# Patient Record
Sex: FEMALE | Race: WHITE | Employment: UNEMPLOYED | URBAN - METROPOLITAN AREA
[De-identification: names, ages, dates, MRNs, and addresses within clinical notes are randomized per-mention and may not be internally consistent; named-entity substitution may affect disease eponyms.]

---

## 2017-06-16 PROBLEM — R32 INCONTINENCE IN FEMALE: Status: ACTIVE | Noted: 2017-06-16

## 2017-06-16 PROBLEM — N39.0 URINARY TRACT INFECTION WITHOUT HEMATURIA: Status: ACTIVE | Noted: 2017-06-16

## 2017-07-07 PROBLEM — R21 RASH OF GENITALIA: Status: ACTIVE | Noted: 2017-07-07

## 2017-07-07 PROBLEM — R35.0 URINARY FREQUENCY: Status: ACTIVE | Noted: 2017-07-07

## 2018-07-02 PROBLEM — N93.9 MENSTRUAL BLEEDING PROBLEM: Status: ACTIVE | Noted: 2018-07-02

## 2018-07-02 PROBLEM — R31.9 HEMATURIA: Status: ACTIVE | Noted: 2018-07-02

## 2018-09-21 ENCOUNTER — HOSPITAL ENCOUNTER (OUTPATIENT)
Dept: MAMMOGRAPHY | Age: 53
Discharge: HOME | End: 2018-09-21
Attending: OBSTETRICS & GYNECOLOGY
Payer: COMMERCIAL

## 2018-09-21 DIAGNOSIS — Z12.31 ENCOUNTER FOR SCREENING MAMMOGRAM FOR MALIGNANT NEOPLASM OF BREAST: ICD-10-CM

## 2018-09-21 PROCEDURE — 77067 SCR MAMMO BI INCL CAD: CPT

## 2019-10-16 PROBLEM — G89.29 CHRONIC PAIN OF RIGHT UPPER EXTREMITY: Status: ACTIVE | Noted: 2019-10-16

## 2019-10-16 PROBLEM — M48.02 CERVICAL STENOSIS OF SPINAL CANAL: Status: ACTIVE | Noted: 2019-10-16

## 2019-10-16 PROBLEM — M79.601 CHRONIC PAIN OF RIGHT UPPER EXTREMITY: Status: ACTIVE | Noted: 2019-10-16

## 2019-10-16 PROBLEM — M50.30 DDD (DEGENERATIVE DISC DISEASE), CERVICAL: Status: ACTIVE | Noted: 2019-10-16

## 2019-10-16 PROBLEM — M54.2 NECK PAIN: Status: ACTIVE | Noted: 2019-10-16

## 2019-12-19 ENCOUNTER — HOSPITAL ENCOUNTER (OUTPATIENT)
Dept: SURGERY | Age: 54
Discharge: HOME | End: 2019-12-19
Payer: COMMERCIAL

## 2019-12-19 VITALS
BODY MASS INDEX: 29.69 KG/M2 | RESPIRATION RATE: 16 BRPM | OXYGEN SATURATION: 95 % | HEART RATE: 64 BPM | DIASTOLIC BLOOD PRESSURE: 112 MMHG | TEMPERATURE: 98.1 F | HEIGHT: 63 IN | SYSTOLIC BLOOD PRESSURE: 156 MMHG | WEIGHT: 167.56 LBS

## 2019-12-19 LAB
ANION GAP SERPL CALC-SCNC: 5 MMOL/L (ref 7–16)
APPEARANCE UR: CLEAR
ATRIAL RATE: 54 BPM
BACTERIA SPEC CULT: NORMAL
BASOPHILS # BLD: 0 K/UL (ref 0–0.2)
BASOPHILS NFR BLD: 0 % (ref 0–2)
BILIRUB UR QL: NEGATIVE
BUN SERPL-MCNC: 16 MG/DL (ref 6–23)
CALCIUM SERPL-MCNC: 8.8 MG/DL (ref 8.3–10.4)
CALCULATED P AXIS, ECG09: 60 DEGREES
CALCULATED R AXIS, ECG10: 22 DEGREES
CALCULATED T AXIS, ECG11: 33 DEGREES
CHLORIDE SERPL-SCNC: 111 MMOL/L (ref 98–107)
CO2 SERPL-SCNC: 30 MMOL/L (ref 21–32)
COLOR UR: YELLOW
CREAT SERPL-MCNC: 0.87 MG/DL (ref 0.6–1)
DIAGNOSIS, 93000: NORMAL
DIFFERENTIAL METHOD BLD: NORMAL
EOSINOPHIL # BLD: 0.1 K/UL (ref 0–0.8)
EOSINOPHIL NFR BLD: 1 % (ref 0.5–7.8)
ERYTHROCYTE [DISTWIDTH] IN BLOOD BY AUTOMATED COUNT: 11.9 % (ref 11.9–14.6)
GLUCOSE SERPL-MCNC: 87 MG/DL (ref 65–100)
GLUCOSE UR STRIP.AUTO-MCNC: NEGATIVE MG/DL
HCT VFR BLD AUTO: 45 % (ref 35.8–46.3)
HGB BLD-MCNC: 14.6 G/DL (ref 11.7–15.4)
HGB UR QL STRIP: NEGATIVE
IMM GRANULOCYTES # BLD AUTO: 0.1 K/UL (ref 0–0.5)
IMM GRANULOCYTES NFR BLD AUTO: 1 % (ref 0–5)
KETONES UR QL STRIP.AUTO: NEGATIVE MG/DL
LEUKOCYTE ESTERASE UR QL STRIP.AUTO: NEGATIVE
LYMPHOCYTES # BLD: 3.1 K/UL (ref 0.5–4.6)
LYMPHOCYTES NFR BLD: 44 % (ref 13–44)
MCH RBC QN AUTO: 30.9 PG (ref 26.1–32.9)
MCHC RBC AUTO-ENTMCNC: 32.4 G/DL (ref 31.4–35)
MCV RBC AUTO: 95.3 FL (ref 79.6–97.8)
MONOCYTES # BLD: 0.6 K/UL (ref 0.1–1.3)
MONOCYTES NFR BLD: 9 % (ref 4–12)
NEUTS SEG # BLD: 3.1 K/UL (ref 1.7–8.2)
NEUTS SEG NFR BLD: 45 % (ref 43–78)
NITRITE UR QL STRIP.AUTO: NEGATIVE
NRBC # BLD: 0 K/UL (ref 0–0.2)
P-R INTERVAL, ECG05: 162 MS
PH UR STRIP: 5.5 [PH] (ref 5–9)
PLATELET # BLD AUTO: 235 K/UL (ref 150–450)
PMV BLD AUTO: 10.4 FL (ref 9.4–12.3)
POTASSIUM SERPL-SCNC: 4.1 MMOL/L (ref 3.5–5.1)
PROT UR STRIP-MCNC: NEGATIVE MG/DL
Q-T INTERVAL, ECG07: 422 MS
QRS DURATION, ECG06: 68 MS
QTC CALCULATION (BEZET), ECG08: 400 MS
RBC # BLD AUTO: 4.72 M/UL (ref 4.05–5.2)
SERVICE CMNT-IMP: NORMAL
SODIUM SERPL-SCNC: 146 MMOL/L (ref 136–145)
SP GR UR REFRACTOMETRY: 1.03 (ref 1–1.02)
UROBILINOGEN UR QL STRIP.AUTO: 0.2 EU/DL (ref 0.2–1)
VENTRICULAR RATE, ECG03: 54 BPM
WBC # BLD AUTO: 7 K/UL (ref 4.3–11.1)

## 2019-12-19 PROCEDURE — 93005 ELECTROCARDIOGRAM TRACING: CPT | Performed by: ANESTHESIOLOGY

## 2019-12-19 PROCEDURE — 87641 MR-STAPH DNA AMP PROBE: CPT

## 2019-12-19 PROCEDURE — 85025 COMPLETE CBC W/AUTO DIFF WBC: CPT

## 2019-12-19 PROCEDURE — 81003 URINALYSIS AUTO W/O SCOPE: CPT

## 2019-12-19 PROCEDURE — 80048 BASIC METABOLIC PNL TOTAL CA: CPT

## 2019-12-19 PROCEDURE — 77030027138 HC INCENT SPIROMETER -A

## 2019-12-19 RX ORDER — GABAPENTIN 100 MG/1
100 CAPSULE ORAL AS NEEDED
COMMUNITY
End: 2020-07-30

## 2019-12-19 NOTE — PERIOP NOTES
PLEASE CONTINUE TAKING ALL PRESCRIPTION MEDICATIONS UP TO THE DAY OF SURGERY UNLESS OTHERWISE DIRECTED BELOW. DISCONTINUE all vitamins and supplements 7 days prior to surgery. DISCONTINUE Non-Steriodal Anti-Inflammatory (NSAIDS) such as Advil and Aleve 5 days prior to surgery. Home Medications to take  the day of surgery    Buspirone, Citalopram, Esomeprazole           Home Medications   to Hold           Comments   May have Tylenol if needed             Please do not bring home medications with you on the day of surgery unless otherwise directed by your nurse. If you are instructed to bring home medications, please give them to your nurse as they will be administered by the nursing staff. If you have any questions, please call [facility] [phone] or [address] [place] [address] [phone].

## 2019-12-19 NOTE — PERIOP NOTES
Patient verified name & . Order to obtain consent:Order for consent NOT found in EHR at time of PAT visit. Unable to verify case posting against order; surgery verified by patient. TYPE  CASE:lb              Orders: NO received  Labs per Spine protocol:  CBC, BMP, MRSA, U/A   Labs per anesthesia protocol: EKG, T&S on DOS  EKG/cardiac records  : Today, SB (54) acceptable per anesthesia guidelines. Glucose: Not required    Medication bottles were NOT visualized today. Instructed patient to continue previous medications as prescribed prior to surgery and  to [address] ON THE DAY OF SURGERY according to anesthesia guidelines with a small sip of water : Celexa, Buspar, Protonix       Continue all previous medications unless otherwise directed. Instructed patient to hold all vitamins and supplements (with exception of renal vitamins) and the following medications prior to surgery: NSAIDS    Pt viewed Spine Pre-hab Video. All further questions were addressed. Pt was provided with antibacterial or non-moisturizing soap, Hibiclens, long-handled sponge, Spine Recovery booklet and incentive spirometer. Pt correctly demonstrated use of incentive spirometer and instruction to bring it to the hospital on day of surgery. Handouts and all Surgery instructions provided to pt and pt verbalizes understanding. Patient Guide to Surgery Packet provided to patient. Packet includes Patient Guide to surgery handout, Facts about Pain Management handout, Facts about Urinary Catherization handout, Hand Hygiene handout, Patient Education and Teaching Sheet -Transfusion of Blood and Blood Products handout, and  [facility] frequent question and answer sheet. Guide reviewed with the patient and all questions answered to the satisfaction of the patient. Pt advised to visit [email or web address] for more educational information regarding anesthesia and to record any additional questions that arise so that it can be addressed by the anesthesiologist on the morning of surgery. Patient instructed on the following and verbalized understanding:  Arrive at main entrance, time of arrival to be called the day before by 1700. Responsible adult must drive patient to and from hospital, stay during surgery and 24 hours postoperatively. Npo after midnight including gum, mints and ice chips. Shower using hibiclens the night before and the morning of surgery. Hibiclens provided to the patient with handout and verbal instructions for use. Leave all valuables at home. Instructed on no jewelry or body piercings on the dos. Bring insurance card and ID. No perfumes, oil, powder, colognes, makeup or  lotions on the skin. Patient verbalized understanding of all instructions and provided all medical/health information to the best of their ability.

## 2019-12-19 NOTE — PERIOP NOTES
Patient tested positive for Flu A on 12/16/2019, called and informed Dr. Joselin Alicia. Patient has no fever, lung sounds are clear. Is taking Tamiflu, will be completed on 12/20/2019. Per Dr. Joselin Alicia, if patient has fever she will need to call and notify Dr. Luis Miguel Hansen.

## 2019-12-24 RX ORDER — CHLORTHALIDONE 25 MG/1
12.5 TABLET ORAL DAILY
COMMUNITY
End: 2020-07-30

## 2019-12-24 RX ORDER — CYCLOBENZAPRINE HCL 10 MG
TABLET ORAL
COMMUNITY
End: 2020-02-04 | Stop reason: SDUPTHER

## 2019-12-24 NOTE — PERIOP NOTES
Patient called to update medication list.  Chorthalidone and Flexeril added to medication list.  Patient instructed to not take either medication dos. Patient verbalized understanding.

## 2019-12-26 ENCOUNTER — ANESTHESIA EVENT (OUTPATIENT)
Dept: SURGERY | Age: 54
End: 2019-12-26
Payer: COMMERCIAL

## 2019-12-27 ENCOUNTER — ANESTHESIA (OUTPATIENT)
Dept: SURGERY | Age: 54
End: 2019-12-27
Payer: COMMERCIAL

## 2019-12-27 ENCOUNTER — HOSPITAL ENCOUNTER (OUTPATIENT)
Age: 54
Setting detail: OUTPATIENT SURGERY
Discharge: HOME | End: 2019-12-27
Attending: NEUROLOGICAL SURGERY | Admitting: NEUROLOGICAL SURGERY
Payer: COMMERCIAL

## 2019-12-27 ENCOUNTER — APPOINTMENT (OUTPATIENT)
Dept: GENERAL RADIOLOGY | Age: 54
End: 2019-12-27
Attending: NEUROLOGICAL SURGERY
Payer: COMMERCIAL

## 2019-12-27 VITALS
DIASTOLIC BLOOD PRESSURE: 67 MMHG | BODY MASS INDEX: 28.56 KG/M2 | HEIGHT: 63 IN | WEIGHT: 161.2 LBS | OXYGEN SATURATION: 94 % | RESPIRATION RATE: 18 BRPM | SYSTOLIC BLOOD PRESSURE: 150 MMHG | TEMPERATURE: 98.6 F | HEART RATE: 83 BPM

## 2019-12-27 DIAGNOSIS — M48.02 CERVICAL STENOSIS OF SPINAL CANAL: Primary | ICD-10-CM

## 2019-12-27 LAB
ABO + RH BLD: NORMAL
BLOOD GROUP ANTIBODIES SERPL: NORMAL
SPECIMEN EXP DATE BLD: NORMAL

## 2019-12-27 PROCEDURE — 77030021678 HC GLIDESCP STAT DISP VERT -B: Performed by: ANESTHESIOLOGY

## 2019-12-27 PROCEDURE — 74011000250 HC RX REV CODE- 250: Performed by: NEUROLOGICAL SURGERY

## 2019-12-27 PROCEDURE — 76210000016 HC OR PH I REC 1 TO 1.5 HR: Performed by: NEUROLOGICAL SURGERY

## 2019-12-27 PROCEDURE — 74011250636 HC RX REV CODE- 250/636: Performed by: NEUROLOGICAL SURGERY

## 2019-12-27 PROCEDURE — 77030012894: Performed by: NEUROLOGICAL SURGERY

## 2019-12-27 PROCEDURE — 72040 X-RAY EXAM NECK SPINE 2-3 VW: CPT

## 2019-12-27 PROCEDURE — 77030036803 HC CGE SPN ANT CERV STALIFC CTNL -I: Performed by: NEUROLOGICAL SURGERY

## 2019-12-27 PROCEDURE — 74011250636 HC RX REV CODE- 250/636: Performed by: NURSE ANESTHETIST, CERTIFIED REGISTERED

## 2019-12-27 PROCEDURE — 77030011267 HC ELECTRD BLD COVD -A: Performed by: NEUROLOGICAL SURGERY

## 2019-12-27 PROCEDURE — 76210000020 HC REC RM PH II FIRST 0.5 HR: Performed by: NEUROLOGICAL SURGERY

## 2019-12-27 PROCEDURE — 77030025420 HC BUR NEUR TPS STRY -C: Performed by: NEUROLOGICAL SURGERY

## 2019-12-27 PROCEDURE — 77030018836 HC SOL IRR NACL ICUM -A: Performed by: NEUROLOGICAL SURGERY

## 2019-12-27 PROCEDURE — 77030018390 HC SPNG HEMSTAT2 J&J -B: Performed by: NEUROLOGICAL SURGERY

## 2019-12-27 PROCEDURE — 76060000034 HC ANESTHESIA 1.5 TO 2 HR: Performed by: NEUROLOGICAL SURGERY

## 2019-12-27 PROCEDURE — 77030040361 HC SLV COMPR DVT MDII -B: Performed by: NEUROLOGICAL SURGERY

## 2019-12-27 PROCEDURE — 77030009868 HC PIN DISTR CASPR AESC -B: Performed by: NEUROLOGICAL SURGERY

## 2019-12-27 PROCEDURE — 77030040922 HC BLNKT HYPOTHRM STRY -A: Performed by: ANESTHESIOLOGY

## 2019-12-27 PROCEDURE — 77030010507 HC ADH SKN DERMBND J&J -B: Performed by: NEUROLOGICAL SURGERY

## 2019-12-27 PROCEDURE — 77030019908 HC STETH ESOPH SIMS -A: Performed by: ANESTHESIOLOGY

## 2019-12-27 PROCEDURE — 77030037088 HC TUBE ENDOTRACH ORAL NSL COVD-A: Performed by: ANESTHESIOLOGY

## 2019-12-27 PROCEDURE — 74011000250 HC RX REV CODE- 250: Performed by: NURSE ANESTHETIST, CERTIFIED REGISTERED

## 2019-12-27 PROCEDURE — 86900 BLOOD TYPING SEROLOGIC ABO: CPT

## 2019-12-27 PROCEDURE — 77030003029 HC SUT VCRL J&J -B: Performed by: NEUROLOGICAL SURGERY

## 2019-12-27 PROCEDURE — 74011250636 HC RX REV CODE- 250/636: Performed by: ANESTHESIOLOGY

## 2019-12-27 PROCEDURE — 77030003666 HC NDL SPINAL BD -A: Performed by: NEUROLOGICAL SURGERY

## 2019-12-27 PROCEDURE — 88304 TISSUE EXAM BY PATHOLOGIST: CPT

## 2019-12-27 PROCEDURE — 76010000153 HC OR TIME 1.5 TO 2 HR: Performed by: NEUROLOGICAL SURGERY

## 2019-12-27 PROCEDURE — 77030030163 HC BN WAX J&J -A: Performed by: NEUROLOGICAL SURGERY

## 2019-12-27 PROCEDURE — C1713 ANCHOR/SCREW BN/BN,TIS/BN: HCPCS | Performed by: NEUROLOGICAL SURGERY

## 2019-12-27 PROCEDURE — 74011250637 HC RX REV CODE- 250/637: Performed by: NEUROLOGICAL SURGERY

## 2019-12-27 DEVICE — IMPLANTABLE DEVICE
Type: IMPLANTABLE DEVICE | Site: SPINE CERVICAL | Status: FUNCTIONAL
Brand: STALIF C

## 2019-12-27 DEVICE — GRAFT BNE SUB 1CC 2MM GRAN ALLOGENIC MORPHOGENETIC PROT W: Type: IMPLANTABLE DEVICE | Site: SPINE CERVICAL | Status: FUNCTIONAL

## 2019-12-27 DEVICE — IMPLANTABLE DEVICE
Type: IMPLANTABLE DEVICE | Site: SPINE CERVICAL | Status: FUNCTIONAL
Brand: STALIF C-TI

## 2019-12-27 RX ORDER — OXYCODONE AND ACETAMINOPHEN 5; 325 MG/1; MG/1
1 TABLET ORAL AS NEEDED
Status: DISCONTINUED | OUTPATIENT
Start: 2019-12-27 | End: 2019-12-27 | Stop reason: HOSPADM

## 2019-12-27 RX ORDER — SODIUM CHLORIDE, SODIUM LACTATE, POTASSIUM CHLORIDE, CALCIUM CHLORIDE 600; 310; 30; 20 MG/100ML; MG/100ML; MG/100ML; MG/100ML
75 INJECTION, SOLUTION INTRAVENOUS CONTINUOUS
Status: DISCONTINUED | OUTPATIENT
Start: 2019-12-27 | End: 2019-12-27 | Stop reason: HOSPADM

## 2019-12-27 RX ORDER — SODIUM CHLORIDE 0.9 % (FLUSH) 0.9 %
5-40 SYRINGE (ML) INJECTION AS NEEDED
Status: DISCONTINUED | OUTPATIENT
Start: 2019-12-27 | End: 2019-12-27 | Stop reason: HOSPADM

## 2019-12-27 RX ORDER — EPHEDRINE SULFATE/0.9% NACL/PF 50 MG/5 ML
SYRINGE (ML) INTRAVENOUS AS NEEDED
Status: DISCONTINUED | OUTPATIENT
Start: 2019-12-27 | End: 2019-12-27 | Stop reason: HOSPADM

## 2019-12-27 RX ORDER — LIDOCAINE HYDROCHLORIDE 20 MG/ML
INJECTION, SOLUTION EPIDURAL; INFILTRATION; INTRACAUDAL; PERINEURAL AS NEEDED
Status: DISCONTINUED | OUTPATIENT
Start: 2019-12-27 | End: 2019-12-27 | Stop reason: HOSPADM

## 2019-12-27 RX ORDER — FENTANYL CITRATE 50 UG/ML
INJECTION, SOLUTION INTRAMUSCULAR; INTRAVENOUS AS NEEDED
Status: DISCONTINUED | OUTPATIENT
Start: 2019-12-27 | End: 2019-12-27 | Stop reason: HOSPADM

## 2019-12-27 RX ORDER — LIDOCAINE HYDROCHLORIDE 10 MG/ML
0.1 INJECTION INFILTRATION; PERINEURAL AS NEEDED
Status: DISCONTINUED | OUTPATIENT
Start: 2019-12-27 | End: 2019-12-27 | Stop reason: HOSPADM

## 2019-12-27 RX ORDER — PROPOFOL 10 MG/ML
INJECTION, EMULSION INTRAVENOUS AS NEEDED
Status: DISCONTINUED | OUTPATIENT
Start: 2019-12-27 | End: 2019-12-27 | Stop reason: HOSPADM

## 2019-12-27 RX ORDER — CEFAZOLIN SODIUM/WATER 2 G/20 ML
2 SYRINGE (ML) INTRAVENOUS ONCE
Status: COMPLETED | OUTPATIENT
Start: 2019-12-27 | End: 2019-12-27

## 2019-12-27 RX ORDER — MIDAZOLAM HYDROCHLORIDE 1 MG/ML
2 INJECTION, SOLUTION INTRAMUSCULAR; INTRAVENOUS
Status: COMPLETED | OUTPATIENT
Start: 2019-12-27 | End: 2019-12-27

## 2019-12-27 RX ORDER — DEXAMETHASONE SODIUM PHOSPHATE 4 MG/ML
INJECTION, SOLUTION INTRA-ARTICULAR; INTRALESIONAL; INTRAMUSCULAR; INTRAVENOUS; SOFT TISSUE AS NEEDED
Status: DISCONTINUED | OUTPATIENT
Start: 2019-12-27 | End: 2019-12-27 | Stop reason: HOSPADM

## 2019-12-27 RX ORDER — OXYCODONE AND ACETAMINOPHEN 7.5; 325 MG/1; MG/1
1 TABLET ORAL
Qty: 15 TAB | Refills: 0 | Status: SHIPPED | OUTPATIENT
Start: 2019-12-27 | End: 2020-01-01

## 2019-12-27 RX ORDER — SODIUM CHLORIDE 0.9 % (FLUSH) 0.9 %
5-40 SYRINGE (ML) INJECTION EVERY 8 HOURS
Status: DISCONTINUED | OUTPATIENT
Start: 2019-12-27 | End: 2019-12-27 | Stop reason: HOSPADM

## 2019-12-27 RX ORDER — HYDROMORPHONE HYDROCHLORIDE 2 MG/ML
0.5 INJECTION, SOLUTION INTRAMUSCULAR; INTRAVENOUS; SUBCUTANEOUS
Status: DISCONTINUED | OUTPATIENT
Start: 2019-12-27 | End: 2019-12-27 | Stop reason: HOSPADM

## 2019-12-27 RX ORDER — ROCURONIUM BROMIDE 10 MG/ML
INJECTION, SOLUTION INTRAVENOUS AS NEEDED
Status: DISCONTINUED | OUTPATIENT
Start: 2019-12-27 | End: 2019-12-27 | Stop reason: HOSPADM

## 2019-12-27 RX ORDER — NALOXONE HYDROCHLORIDE 0.4 MG/ML
0.2 INJECTION, SOLUTION INTRAMUSCULAR; INTRAVENOUS; SUBCUTANEOUS AS NEEDED
Status: DISCONTINUED | OUTPATIENT
Start: 2019-12-27 | End: 2019-12-27 | Stop reason: HOSPADM

## 2019-12-27 RX ADMIN — MIDAZOLAM 2 MG: 1 INJECTION INTRAMUSCULAR; INTRAVENOUS at 09:41

## 2019-12-27 RX ADMIN — FENTANYL CITRATE 50 MCG: 50 INJECTION INTRAMUSCULAR; INTRAVENOUS at 11:16

## 2019-12-27 RX ADMIN — Medication 3 AMPULE: at 09:27

## 2019-12-27 RX ADMIN — Medication 5 MG: at 12:01

## 2019-12-27 RX ADMIN — PHENYLEPHRINE HYDROCHLORIDE 100 MCG: 10 INJECTION INTRAVENOUS at 12:01

## 2019-12-27 RX ADMIN — DEXAMETHASONE SODIUM PHOSPHATE 4 MG: 4 INJECTION, SOLUTION INTRAMUSCULAR; INTRAVENOUS at 11:32

## 2019-12-27 RX ADMIN — ROCURONIUM BROMIDE 40 MG: 10 INJECTION, SOLUTION INTRAVENOUS at 11:04

## 2019-12-27 RX ADMIN — SODIUM CHLORIDE, SODIUM LACTATE, POTASSIUM CHLORIDE, AND CALCIUM CHLORIDE 75 ML/HR: 600; 310; 30; 20 INJECTION, SOLUTION INTRAVENOUS at 09:27

## 2019-12-27 RX ADMIN — HYDROMORPHONE HYDROCHLORIDE 0.5 MG: 2 INJECTION INTRAMUSCULAR; INTRAVENOUS; SUBCUTANEOUS at 12:52

## 2019-12-27 RX ADMIN — SUGAMMADEX 200 MG: 100 INJECTION, SOLUTION INTRAVENOUS at 12:12

## 2019-12-27 RX ADMIN — FENTANYL CITRATE 50 MCG: 50 INJECTION INTRAMUSCULAR; INTRAVENOUS at 11:04

## 2019-12-27 RX ADMIN — LIDOCAINE HYDROCHLORIDE 40 MG: 20 INJECTION, SOLUTION EPIDURAL; INFILTRATION; INTRACAUDAL; PERINEURAL at 11:04

## 2019-12-27 RX ADMIN — PROPOFOL 200 MG: 10 INJECTION, EMULSION INTRAVENOUS at 11:55

## 2019-12-27 RX ADMIN — Medication 5 MG: at 11:51

## 2019-12-27 RX ADMIN — Medication 10 MG: at 12:07

## 2019-12-27 RX ADMIN — Medication 2 G: at 11:13

## 2019-12-27 RX ADMIN — FENTANYL CITRATE 25 MCG: 50 INJECTION INTRAMUSCULAR; INTRAVENOUS at 11:56

## 2019-12-27 RX ADMIN — FENTANYL CITRATE 25 MCG: 50 INJECTION INTRAMUSCULAR; INTRAVENOUS at 11:59

## 2019-12-27 RX ADMIN — HYDROMORPHONE HYDROCHLORIDE 0.5 MG: 2 INJECTION INTRAMUSCULAR; INTRAVENOUS; SUBCUTANEOUS at 13:01

## 2019-12-27 RX ADMIN — ROCURONIUM BROMIDE 10 MG: 10 INJECTION, SOLUTION INTRAVENOUS at 11:55

## 2019-12-27 RX ADMIN — PROPOFOL 200 MG: 10 INJECTION, EMULSION INTRAVENOUS at 11:04

## 2019-12-27 NOTE — ANESTHESIA POSTPROCEDURE EVALUATION
Procedure(s):  ANTERIOR CERVICAL DISCECTOMY WITH FUSION C3-4.    general    Anesthesia Post Evaluation      Multimodal analgesia: multimodal analgesia used between 6 hours prior to anesthesia start to PACU discharge  Patient location during evaluation: PACU  Patient participation: complete - patient participated  Level of consciousness: awake and alert  Pain management: adequate  Airway patency: patent  Anesthetic complications: no  Cardiovascular status: acceptable  Respiratory status: acceptable  Hydration status: acceptable  Post anesthesia nausea and vomiting:  none      Vitals Value Taken Time   /67 12/27/2019  1:32 PM   Temp 37 °C (98.6 °F) 12/27/2019  1:06 PM   Pulse 78 12/27/2019  1:37 PM   Resp 18 12/27/2019  1:32 PM   SpO2 94 % 12/27/2019  1:37 PM   Vitals shown include unvalidated device data.

## 2019-12-27 NOTE — DISCHARGE INSTRUCTIONS
[facility]  [address]  [phone]    Cervical (Neck) Fusion Postoperative Home Instructions    - SHOWERING: You may shower the first day you are home with the dressing on. Do not soak in a tub for at least three weeks. If your dressing gets wet, change it according to the written instructions below. You may remove the dressing in 3 days. Use only soap and water on the incision and pat it dry. Do not scrub the incision.    - WOUND CARE: A small to moderate amount of reddish drainage on the dressing is normal the first 1-2 days after surgery. If your dressing becomes soaked with drainage, let your doctor know. [facility] HANDS BEFORE AND AFTER INCISION CARE.    - SIGNS OF INFECTION: Please notify your physician for any of the following: a temperature greater than 100.5, extreme tenderness at the wound, excessive redness and/or swelling, or large amounts of drainage from the wound. If you think you have a wound infection, call your physician's office immediately.    - SWALLOWING: Don't be alarmed if it seems there is a lump in your throat for several days after surgery- this is normal. Eat only soft foods and drink plenty of fluids until your throat feels better. If you begin having trouble swallowing, call the office immediately. -EATING: [place] foods today, nothing spicy or greasy.    - DRIVING: You may not begin driving until after your visit to the physician's office for a wound and suture check. This is normally 7-10 days after you come home from the hospital.    - MEDICATIONS: You may not take anti-inflammatory medications. These include over-the-counter ibuprofen products such as Motrin, Advil, Aleve and other related medications or prescription medications such as Ultram, Naproxen, Indocin, or Celebrex and others. These medications slow down the bone healing. You may take Tylenol unless your prescribed medication has Tylenol in it.  The pain medicine prescribed may be taken as needed. You should continue taking a stool softener twice a day, drink plenty of water and eat high fiber foods to avoid constipation. This is a common problem patients experience while taking pain medicine.    - DEEP BREATHING EXERCISES: Continue to use your incentive spirometer for deep breathing exercises. - ACTIVITY: Avoid reaching overhead, particularly to lift things, until you have been told that your fusion has healed. Do not lift objects heavier than a coffee cup or a newspaper. You shouldn't lift anything heavier than 2-5 pounds. When lifting, you should bend with your knees and keep your back straight. Sleeping may be difficult and sometimes requires you to change positions frequently; try to sleep with your head elevated 15-30 degrees. You may turn your head gradually from side to side, but avoid placing your chin to your chest or flexing your head backwards. You may remove your TITI hose when consistently walking. You may do steps and inclines in moderation.    - SEXUAL RELATIONS: You may resume sexual relations 2 weeks after your surgery. - WALKING PROGRAM: After your sutures are removed or dissolved, it is very important that you begin a progressive walking program. Walking strengthens the spinal muscles and will help protect your disc and vertebrae. You will need to increase your walking program up to two miles per day over the next four weeks. You should begin slowly with 1/8 to 1/4 of a mile and add to this each day. Please be very consistent with your walking program, as this is a vital part of your recovery.    - SYMPTOMS AFTER SURGERY: Don't be alarmed if you still have some of the same symptoms you had prior to surgery. The nerves often require time to heal after the pressure has been relieved. You may also experience pain between your shoulder blades which is common after this surgery.  The level of pain you experience should improve as your body heals. Please call our office if you have any other questions or problems. Listen to your body; it will tell you if you are overdoing it. Use common sense and take care of yourself! After general anesthesia or intravenous sedation, for 24 hours or while taking prescription Narcotics:  · Limit your activities  · A responsible adult needs to be with you for the next 24 hours  · Do not drive and operate hazardous machinery  · Do not make important personal or business decisions  · Do  not drink alcoholic beverages  · If you have not urinated within 8 hours after discharge, please contact your surgeon on call. · If you have sleep apnea and have a CPAP machine, please use it for all naps and sleeping. · Please use caution when taking narcotics and any of your home medications that may cause drowsiness. *  Please give a list of your current medications to your Primary Care Provider. *  Please update this list whenever your medications are discontinued, doses are      changed, or new medications (including over-the-counter products) are added. *  Please carry medication information at all times in case of emergency situations. These are general instructions for a healthy lifestyle:  No smoking/ No tobacco products/ Avoid exposure to second hand smoke  Surgeon General's Warning:  Quitting smoking now greatly reduces serious risk to your health. Obesity, smoking, and sedentary lifestyle greatly increases your risk for illness  A healthy diet, regular physical exercise & weight monitoring are important for maintaining a healthy lifestyle    You may be retaining fluid if you have a history of heart failure or if you experience any of the following symptoms:  Weight gain of 3 pounds or more overnight or 5 pounds in a week, increased swelling in our hands or feet or shortness of breath while lying flat in bed.   Please call your doctor as soon as you notice any of these symptoms; do not wait until your next office visit.

## 2019-12-27 NOTE — H&P
[address] [place] [address]  HISTORY AND PHYSICAL    Name:  Tayler Patterson  MR#:  [id number]  :  1965  ACCOUNT #:  [de-identified]  ADMIT DATE:  2019      CHIEF COMPLAINT:  Right upper extremity pain x years. HISTORY OF PRESENT ILLNESS:  A 61-year lady with a greater than 10-year history of right upper extremity pain refractory to physical therapy, cortisone injections, and pain management; and previous cervical fusion, C4 through C6. MRI scan was positive for spinal stenosis at C3-C4, confirmed by MRI scanning. EMG was positive for radiculopathy at or above the C4 level. ALLERGIES:  PENICILLIN. PAST MEDICAL HISTORY:  Significant for renal calculi, GERD, hypertension, migraine headaches, anxiety, and restless legs syndrome. FAMILY HISTORY:  Positive for stroke, heart disease, and hypertension. SOCIAL HISTORY:  She is . She is a former smoker, having smoked for 32 years and quit in 2016. She is a nondrinker. REVIEW OF SYSTEMS:  Negative for shortness of breath, chest pain, or fatigue. PHYSICAL EXAMINATION:  GENERAL:  A well-developed, well-nourished lady in no obvious distress except for right upper extremity pain today. HEENT:  Unremarkable. Nose and throat are clear. CHEST:  Clear bilaterally. CARDIAC:  Regular rate and rhythm. No murmurs or gallops. ABDOMEN:  Soft, benign, nontender. No masses. Bowel sounds positive. EXTREMITIES:  Free of deformities. NEUROLOGIC:  Awake, alert, oriented x3. Cranial nerves II through XII intact. Motor strength 5/5 except for right external rotators at the shoulder, both weak at 3/5. Symmetric reflexes. Normal gait. No tremor. IMPRESSION:  Cervical stenosis of the spinal canal with cervical radiculopathy. Conservative measures have failed. PLAN:  Anterior cervical diskectomy and fusion, C3-C4.   The risks are thoroughly explained that include bleeding, infection, weakness, numbness, persistent pain, CSF leak, dysphonia, dysphagia, vascular injury, and death. The existing neurological deficiency may or may not improve even with successful surgery. The patient understands and agrees to proceed.       MD RICK [email or web address]  D:  12/27/2019 10:49  T:  12/27/2019 14:46  JOB #:  [id number]

## 2019-12-27 NOTE — ANESTHESIA PREPROCEDURE EVALUATION
Relevant Problems   No relevant active problems       Anesthetic History     PONV          Review of Systems / Medical History  Patient summary reviewed and pertinent labs reviewed    Pulmonary  Within defined limits                 Neuro/Psych         Headaches (Migraine headaches) and psychiatric history    Comments: Cervical disc disease, right arm pain and numbness    RLS    Anxiety   Cardiovascular    Hypertension (Recently started HCTZ): well controlled              Exercise tolerance: >4 METS     GI/Hepatic/Renal     GERD: well controlled           Endo/Other        Arthritis     Other Findings              Physical Exam    Airway  Mallampati: II  TM Distance: 4 - 6 cm  Neck ROM: normal range of motion   Mouth opening: Normal     Cardiovascular    Rhythm: regular           Dental    Dentition: Bridges     Pulmonary  Breath sounds clear to auscultation               Abdominal  GI exam deferred       Other Findings            Anesthetic Plan    ASA: 2  Anesthesia type: general          Induction: Intravenous  Anesthetic plan and risks discussed with: Patient

## 2019-12-27 NOTE — BRIEF OP NOTE
BRIEF OPERATIVE NOTE    Date of Procedure: 12/27/2019   Preoperative Diagnosis: Cervical spinal stenosis [M48.02]  Postoperative Diagnosis: Cervical spinal stenosis [M48.02]    Procedure(s):  ANTERIOR CERVICAL DISCECTOMY WITH FUSION C3-4  Surgeon(s) and Role:     * Elba Macario MD - Primary         Surgical Assistant: NONE  Surgical Staff:  Circ-1: Karuna Negron RN  Circ-Relief: Leonid Caballero RN  Radiology Technician: Callum Mayes, RT, R, CT; Mark Cuadra, RT, R, CT  Scrub Tech-1: Loida Manzo  Scrub Tech-Relief: Julia Lee  Scrub Private/Assistant: Nish Silvermans  Event Time In Time Out   Incision Start 1125    Incision Close 1215      Anesthesia: General   Estimated Blood Loss: MINIMAL  Specimens:   ID Type Source Tests Collected by Time Destination   1 : Disc Material C3-4 Preservative Disc Material  Elba Macario MD 12/27/2019 1213 Pathology      Findings: STENOSIS  Complications: NONE  Implants:   Implant Name Type Inv.  Item Serial No.  Lot No. LRB No. Used Action   GRAFT BNE GRAN 1ML -- OSTEOAMP - [id number]  GRAFT BNE GRAN 1ML -- OSTEOAMP [id number] [facility]  N/A 1 Implanted   SCR SPNE ABO 4X14MM --  - [id number]  SCR SPNE ABO 4X14MM --   [employer] [id number] N/A 3 Implanted   CAGE SPNE CERV LORDTC 5.5X12MM -- STALIF-C - [id number]  CAGE SPNE CERV LORDTC 5.5X12MM -- STALIF-C  [employer] [id number] N/A 1 Implanted

## 2019-12-28 NOTE — OP NOTES
[facility]  OPERATIVE REPORT    Name:  Iraj Juarez  MR#:  [id number]  :  1965  ACCOUNT #:  [de-identified]  DATE OF SERVICE:  2019    PREOPERATIVE DIAGNOSIS:  Cervical stenosis with cord compression, C3-4. POSTOPERATIVE DIAGNOSIS:  Cervical stenosis with cord compression, C3-4. OPERATIONS AND PROCEDURES:  1. Anterior cervical spine total diskectomy with decompression of spinal cord and nerve roots, C3-4.  2.  Anterior cervical spine interbody fusion with STALIF-C cage, OsteoAMP and bone marrow aspirate, C3-4.  3. Anterior cervical spine instrumentation with STALIF-C screws, C3-4.  4.  Bone marrow aspiration, C3 vertebra. 5.  Continuous intraoperative fluoroscopy. SURGEON:  Ama Rooney MD    FIRST ASSISTANT:  None. ANESTHESIA:  General endotracheal.    ESTIMATED BLOOD LOSS:  Minimal.    PREPARATION:  ChloraPrep. COMPLICATIONS:  None. SPECIMENS REMOVED:  C3-4  disc    IMPLANTS:  See below    HISTORY OF PRESENT ILLNESS:  A 26-year-old lady status post cervical fusion, C4-6 many years ago, now presents with progressive cervical myelopathy refractory to conservative measures. MRI scan was positive for spinal stenosis and cord compression above the fusion at C3-4 and the patient was admitted for surgery as conservative measures have failed. OPERATIVE NOTE:  The patient was brought to the operating. She was carefully placed under general endotracheal anesthesia without complications, placed supine with a roll under the shoulder blades. The head gently extended on a Carson's pillow and the right side of the neck carefully prepped in the usual sterile fashion. C-arm fluoroscopy was used to locate the C3-4 level. An existing skin crease was identified on the right side. This was infiltrated with 1% Xylocaine with epinephrine, incised with 15-blade and carried sharply through the platysma.   Using rostral to caudal subplatysmal dissection, a plane was created within the carotid artery laterally, trachea and esophagus medially. Handheld retractors were placed. The longus coli muscles were stripped bilaterally. Lateral C-arm fluoroscopy confirmed. An 18-gauge spinal needle was inserted correctly into C3-4. Scar tissue was sharply dissected above the plate which was identified. The disk space was then opened with a 15-blade and cleaned with pituitary rongeurs until clear. The [place] distracting pin was placed in the C3 vertebra and using a 3 mm syringe and a 16-gauge Angiocath, approximately 2 mm of bone marrow were aspirated and mixed with OsteoAMP on the back table. The hole was plugged with Nu-Knit, Surgicel, cautery and bone wax. The [employer] drill was used and the endplates at O4-8 were drilled down the posterior ligament which was opened with a 1-mm [place] rongeur and a ball-tip probe was used to probe the neuroforamina which were opened bilaterally. At this point, the wound was irrigated until clear. A 5.5 x 12 mm STALIF-C trial was tightly placed in the disk space with good endplate contact and distraction noted. A 5.5 x 12 mm STALIF-C cage was packed with OsteoAMP and bone marrow aspirate and countersunk to a nice tight fit confirmed by fluoroscopy. STALIF-C screws were placed, two superiorly and one inferiorly with a straight awl and 40 mm self-tapping screws. A nice tight fit was achieved. The wound was irrigated until clear and x-rays were obtained which confirmed good position of the graft and hardware, both in AP and lateral projections. Additional thrombin was placed. Surgifoam was placed. No further bleeding was encountered. The wound was dried and it was closed. The platysma was closed tightly with interrupted 3-0 Vicryl. Subcutaneous tissue closed with #3-0 Vicryl. Skin was closed with Dermabond. The patient tolerated the procedure well, was awakened, extubated and taken to PACU in stable condition.   There were no obvious complications. DISCHARGE INSTRUCTIONS:  Diet regular. Activities as tolerated. No heavy lifting, bending or automobile driving. Showers are permissible but no bath. The patient will contact the physician if she develops any fever, drainage, swelling, cellulitis or neurological change. The patient will contact the office if she develops any fever, drainage or swelling. Return visit in two weeks for wound check. Discharge medications include admission medicines plus Percocet 7.5/325 q.8 hours p.r.n. DISCHARGE CONDITION:  Satisfactory.       MD RICK [email or web address]  D:  12/27/2019 12:23  T:  12/27/2019 23:54  JOB #:  [id number]

## 2019-12-30 ENCOUNTER — APPOINTMENT (OUTPATIENT)
Dept: CT IMAGING | Age: 54
End: 2019-12-30
Attending: EMERGENCY MEDICINE
Payer: COMMERCIAL

## 2019-12-30 ENCOUNTER — HOSPITAL ENCOUNTER (EMERGENCY)
Age: 54
Discharge: HOME | End: 2019-12-30
Attending: EMERGENCY MEDICINE
Payer: COMMERCIAL

## 2019-12-30 VITALS
WEIGHT: 161 LBS | DIASTOLIC BLOOD PRESSURE: 92 MMHG | OXYGEN SATURATION: 96 % | HEIGHT: 63 IN | HEART RATE: 81 BPM | SYSTOLIC BLOOD PRESSURE: 140 MMHG | TEMPERATURE: 97.8 F | BODY MASS INDEX: 28.53 KG/M2 | RESPIRATION RATE: 18 BRPM

## 2019-12-30 DIAGNOSIS — R22.1 NECK SWELLING: Primary | ICD-10-CM

## 2019-12-30 LAB
ALBUMIN SERPL-MCNC: 3.1 G/DL (ref 3.5–5)
ALBUMIN/GLOB SERPL: 0.7 {RATIO} (ref 1.2–3.5)
ALP SERPL-CCNC: 78 U/L (ref 50–136)
ALT SERPL-CCNC: 109 U/L (ref 12–65)
ANION GAP SERPL CALC-SCNC: 6 MMOL/L (ref 7–16)
AST SERPL-CCNC: 83 U/L (ref 15–37)
BASOPHILS # BLD: 0.1 K/UL (ref 0–0.2)
BASOPHILS NFR BLD: 0 % (ref 0–2)
BILIRUB SERPL-MCNC: 0.7 MG/DL (ref 0.2–1.1)
BUN SERPL-MCNC: 16 MG/DL (ref 6–23)
CALCIUM SERPL-MCNC: 9.8 MG/DL (ref 8.3–10.4)
CHLORIDE SERPL-SCNC: 99 MMOL/L (ref 98–107)
CO2 SERPL-SCNC: 30 MMOL/L (ref 21–32)
CREAT SERPL-MCNC: 0.78 MG/DL (ref 0.6–1)
CRP SERPL-MCNC: 6.6 MG/DL (ref 0–0.9)
DIFFERENTIAL METHOD BLD: ABNORMAL
EOSINOPHIL # BLD: 0.1 K/UL (ref 0–0.8)
EOSINOPHIL NFR BLD: 1 % (ref 0.5–7.8)
ERYTHROCYTE [DISTWIDTH] IN BLOOD BY AUTOMATED COUNT: 12.2 % (ref 11.9–14.6)
GLOBULIN SER CALC-MCNC: 4.7 G/DL (ref 2.3–3.5)
GLUCOSE SERPL-MCNC: 84 MG/DL (ref 65–100)
HCT VFR BLD AUTO: 46.6 % (ref 35.8–46.3)
HGB BLD-MCNC: 15.7 G/DL (ref 11.7–15.4)
IMM GRANULOCYTES # BLD AUTO: 0.1 K/UL (ref 0–0.5)
IMM GRANULOCYTES NFR BLD AUTO: 1 % (ref 0–5)
LACTATE BLD-SCNC: 0.39 MMOL/L (ref 0.5–1.9)
LYMPHOCYTES # BLD: 3.2 K/UL (ref 0.5–4.6)
LYMPHOCYTES NFR BLD: 28 % (ref 13–44)
MCH RBC QN AUTO: 31.4 PG (ref 26.1–32.9)
MCHC RBC AUTO-ENTMCNC: 33.7 G/DL (ref 31.4–35)
MCV RBC AUTO: 93.2 FL (ref 79.6–97.8)
MONOCYTES # BLD: 1.6 K/UL (ref 0.1–1.3)
MONOCYTES NFR BLD: 14 % (ref 4–12)
NEUTS SEG # BLD: 6.4 K/UL (ref 1.7–8.2)
NEUTS SEG NFR BLD: 56 % (ref 43–78)
NRBC # BLD: 0 K/UL (ref 0–0.2)
PLATELET # BLD AUTO: 265 K/UL (ref 150–450)
PMV BLD AUTO: 10.1 FL (ref 9.4–12.3)
POTASSIUM SERPL-SCNC: 4.7 MMOL/L (ref 3.5–5.1)
PROCALCITONIN SERPL-MCNC: <0.05 NG/ML
PROT SERPL-MCNC: 7.8 G/DL (ref 6.3–8.2)
RBC # BLD AUTO: 5 M/UL (ref 4.05–5.2)
SODIUM SERPL-SCNC: 135 MMOL/L (ref 136–145)
WBC # BLD AUTO: 11.4 K/UL (ref 4.3–11.1)

## 2019-12-30 PROCEDURE — 70491 CT SOFT TISSUE NECK W/DYE: CPT

## 2019-12-30 PROCEDURE — 83605 ASSAY OF LACTIC ACID: CPT

## 2019-12-30 PROCEDURE — 74011250636 HC RX REV CODE- 250/636: Performed by: EMERGENCY MEDICINE

## 2019-12-30 PROCEDURE — 74011000258 HC RX REV CODE- 258: Performed by: EMERGENCY MEDICINE

## 2019-12-30 PROCEDURE — 99284 EMERGENCY DEPT VISIT MOD MDM: CPT | Performed by: EMERGENCY MEDICINE

## 2019-12-30 PROCEDURE — 96375 TX/PRO/DX INJ NEW DRUG ADDON: CPT | Performed by: EMERGENCY MEDICINE

## 2019-12-30 PROCEDURE — 86140 C-REACTIVE PROTEIN: CPT

## 2019-12-30 PROCEDURE — 80053 COMPREHEN METABOLIC PANEL: CPT

## 2019-12-30 PROCEDURE — 85025 COMPLETE CBC W/AUTO DIFF WBC: CPT

## 2019-12-30 PROCEDURE — 74011636320 HC RX REV CODE- 636/320: Performed by: EMERGENCY MEDICINE

## 2019-12-30 PROCEDURE — 84145 PROCALCITONIN (PCT): CPT

## 2019-12-30 PROCEDURE — 87040 BLOOD CULTURE FOR BACTERIA: CPT

## 2019-12-30 PROCEDURE — 96374 THER/PROPH/DIAG INJ IV PUSH: CPT | Performed by: EMERGENCY MEDICINE

## 2019-12-30 RX ORDER — CEPHALEXIN 500 MG/1
500 CAPSULE ORAL 4 TIMES DAILY
Qty: 28 CAP | Refills: 0 | Status: SHIPPED | OUTPATIENT
Start: 2019-12-30 | End: 2020-01-06

## 2019-12-30 RX ORDER — SODIUM CHLORIDE 0.9 % (FLUSH) 0.9 %
10 SYRINGE (ML) INJECTION
Status: COMPLETED | OUTPATIENT
Start: 2019-12-30 | End: 2019-12-30

## 2019-12-30 RX ORDER — DEXAMETHASONE 2 MG/1
2 TABLET ORAL 2 TIMES DAILY WITH MEALS
Qty: 10 TAB | Refills: 0 | Status: SHIPPED | OUTPATIENT
Start: 2019-12-30 | End: 2020-01-04

## 2019-12-30 RX ORDER — DEXAMETHASONE SODIUM PHOSPHATE 100 MG/10ML
10 INJECTION INTRAMUSCULAR; INTRAVENOUS
Status: COMPLETED | OUTPATIENT
Start: 2019-12-30 | End: 2019-12-30

## 2019-12-30 RX ORDER — CEFAZOLIN SODIUM/WATER 2 G/20 ML
2 SYRINGE (ML) INTRAVENOUS ONCE
Status: COMPLETED | OUTPATIENT
Start: 2019-12-30 | End: 2019-12-30

## 2019-12-30 RX ADMIN — SODIUM CHLORIDE 100 ML: 900 INJECTION, SOLUTION INTRAVENOUS at 14:09

## 2019-12-30 RX ADMIN — Medication 2 G: at 14:37

## 2019-12-30 RX ADMIN — IOPAMIDOL 80 ML: 755 INJECTION, SOLUTION INTRAVENOUS at 14:09

## 2019-12-30 RX ADMIN — DEXAMETHASONE SODIUM PHOSPHATE 10 MG: 10 INJECTION INTRAMUSCULAR; INTRAVENOUS at 14:43

## 2019-12-30 RX ADMIN — Medication 10 ML: at 14:09

## 2019-12-30 NOTE — ED NOTES
I have reviewed discharge instructions with the patient. The patient verbalized understanding. Patient left ED via Discharge Method: ambulatory to Home with sister. Opportunity for questions and clarification provided. Patient given 0 scripts. To continue your aftercare when you leave the hospital, you may receive an automated call from our care team to check in on how you are doing. This is a free service and part of our promise to provide the best care and service to meet your aftercare needs.  If you have questions, or wish to unsubscribe from this service please call [phone]. Thank you for Choosing our [facility] Emergency Department.

## 2019-12-30 NOTE — ED PROVIDER NOTES
[facility] Emergency Department  Arrival Date/Time: 12/30/2019 @  1:04 PM      Matteo Owens  MRN: [id number]      62 y.o. female    YOB: 1965   [phone] (home)     [facility] EMERGENCY DEPT [facility]ARRY/FABIANO  Seen on 12/30/2019 @ 1:24 PM      Today's Chief Complaint:   Chief Complaint   Patient presents with    Neck Swelling      HPI: Pleasant 78-year-old female presents to emergency department with anterior lateral neck swelling. Patient had cervical spine surgery several days ago with Dr. Santy Tamez. Since then has had increased pain and swelling on the right side of her neck    No fever. She does have some redness. No drainage from the wound. She complains of a sore throat. Hurts to swallow. HPI    Review of Systems: Review of Systems   Constitutional: Negative for fever. HENT: Positive for trouble swallowing. Respiratory: Negative for shortness of breath. Skin: Positive for color change and wound. Past Medical History: Primary Care Doctor: Lora Garrido MD  Meds, PMH, PSHx, SocHx at end of this note     Allergies: Allergies   Allergen Reactions    Pcn [Penicillins] Rash         Key Anti-Platelet Anticoagulant Meds     The patient is on no antiplatelet meds or anticoagulants. Physical Exam:  Nursing documentation reviewed. Patient Vitals for the past 24 hrs:   Temp Pulse Resp BP SpO2   12/30/19 1204 97.8 °F (36.6 °C) 83 18 144/87 98 %     Oxygen Therapy  O2 Sat (%): 98 % (12/30/19 1204)  O2 Device: Room air (12/30/19 1204) Vital signs were reviewed. Physical Exam  Vitals signs and nursing note reviewed. Constitutional:       Appearance: Normal appearance. HENT:      Head: Normocephalic. Neck:     Neurological:      Mental Status: She is alert.          MEDICAL DECISION MAKING:   Differential Diagnosis:    MDM  Number of Diagnoses or Management Options  Neck swelling:   Diagnosis management comments: 78-year-old female presents with postoperative swelling and erythema some difficulty swallowing    Differential includes an abscess seroma normal healing    A CT of the neck will be obtained    Labs were obtained. Amount and/or Complexity of Data Reviewed  Clinical lab tests: ordered and reviewed  Tests in the radiology section of CPT®: ordered and reviewed    Risk of Complications, Morbidity, and/or Mortality  Presenting problems: moderate  Diagnostic procedures: minimal  Management options: moderate          Data/Management:    Lab findings during this visit:   Recent Results (from the past 48 hour(s))   CBC WITH AUTOMATED DIFF    Collection Time: 12/30/19 12:07 PM   Result Value Ref Range    WBC 11.4 (H) 4.3 - 11.1 K/uL    RBC 5.00 4.05 - 5.2 M/uL    HGB 15.7 (H) 11.7 - 15.4 g/dL    HCT 46.6 (H) 35.8 - 46.3 %    MCV 93.2 79.6 - 97.8 FL    MCH 31.4 26.1 - 32.9 PG    MCHC 33.7 31.4 - 35.0 g/dL    RDW 12.2 11.9 - 14.6 %    PLATELET 461 489 - 563 K/uL    MPV 10.1 9.4 - 12.3 FL    ABSOLUTE NRBC 0.00 0.0 - 0.2 K/uL    DF AUTOMATED      NEUTROPHILS 56 43 - 78 %    LYMPHOCYTES 28 13 - 44 %    MONOCYTES 14 (H) 4.0 - 12.0 %    EOSINOPHILS 1 0.5 - 7.8 %    BASOPHILS 0 0.0 - 2.0 %    IMMATURE GRANULOCYTES 1 0.0 - 5.0 %    ABS. NEUTROPHILS 6.4 1.7 - 8.2 K/UL    ABS. LYMPHOCYTES 3.2 0.5 - 4.6 K/UL    ABS. MONOCYTES 1.6 (H) 0.1 - 1.3 K/UL    ABS. EOSINOPHILS 0.1 0.0 - 0.8 K/UL    ABS. BASOPHILS 0.1 0.0 - 0.2 K/UL    ABS. IMM.  GRANS. 0.1 0.0 - 0.5 K/UL   METABOLIC PANEL, COMPREHENSIVE    Collection Time: 12/30/19 12:07 PM   Result Value Ref Range    Sodium 135 (L) 136 - 145 mmol/L    Potassium 4.7 3.5 - 5.1 mmol/L    Chloride 99 98 - 107 mmol/L    CO2 30 21 - 32 mmol/L    Anion gap 6 (L) 7 - 16 mmol/L    Glucose 84 65 - 100 mg/dL    BUN 16 6 - 23 MG/DL    Creatinine 0.78 0.6 - 1.0 MG/DL    GFR est AA >60 >60 ml/min/1.73m2    GFR est non-AA >60 >60 ml/min/1.73m2    Calcium 9.8 8.3 - 10.4 MG/DL    Bilirubin, total 0.7 0.2 - 1.1 MG/DL    ALT (SGPT) 109 (H) 12 - 65 U/L AST (SGOT) 83 (H) 15 - 37 U/L    Alk. phosphatase 78 50 - 136 U/L    Protein, total 7.8 6.3 - 8.2 g/dL    Albumin 3.1 (L) 3.5 - 5.0 g/dL    Globulin 4.7 (H) 2.3 - 3.5 g/dL    A-G Ratio 0.7 (L) 1.2 - 3.5         Radiology studies during this visit: Ct Neck Soft Tissue W Cont    Result Date: 12/30/2019  IMPRESSION:  Small postop fluid and gas collection in the right neck just posterior and superior to the submandibular gland which tracks down to the thyroid. No significant enhancement. This could represent postop seroma, hematoma or developing abscess. Medications given in the ED:   Medications   sodium chloride 0.9 % bolus infusion 100 mL (has no administration in time range)   saline peripheral flush soln 10 mL (has no administration in time range)   iopamidol (ISOVUE-370) 76 % injection 80 mL (has no administration in time range)       Recheck and Additional Documentation:  (use .addrecheck  . addsepsis   . addstroke   . addhip  . addhandoff  . addcctime)     Patient looks well after fluids. CT of the neck was obtained and is negative for abscess. There is a small fluid collection    I did discuss with Dr. Luis Miguel Hansen who reviewed the CT images. He requested we start Decadron here in the emergency department and continue it. He will follow her up in the office. Procedure Documentation:   Procedures     Other ED Course Notes:     ED Course as of Dec 31 1322   Mon Dec 30, 2019   1326 WBC(!): 11.4 [GH]   1418 Lactic Acid (POC)(!!): 0.39 [GH]      ED Course User Index  [GH] Linnea Snider MD       Assessment and Plan:    Impression:     ICD-10-CM ICD-9-CM   1. Neck swelling R22.1 784. 2     Disposition:  home  Follow-up:   Follow-up Information     Follow up With Specialties Details Why Contact Info    Violet Valentin MD Internal Medicine   [address]      Robel Wong MD Neurosurgery Call today  [address] [facility] [place]:   Discharge Medication List as of 12/30/2019  2:59 PM      START taking these medications    Details   cephALEXin (KEFLEX) 500 mg capsule Take 1 Cap by mouth four (4) times daily for 7 days. , Normal, Disp-28 Cap, R-0      dexAMETHasone (DECADRON) 2 mg tablet Take 1 Tab by mouth two (2) times daily (with meals) for 5 days. , Normal, Disp-10 Tab, R-0         CONTINUE these medications which have NOT CHANGED    Details   oxyCODONE-acetaminophen (PERCOCET 7.5) 7.5-325 mg per tablet Take 1 Tab by mouth every eight (8) hours as needed for Pain for up to 5 days. Max Daily Amount: 3 Tabs., Normal, Disp-15 Tab, R-0      chlorthalidone (HYGROTEN) 25 mg tablet Take 12.5 mg by mouth daily. , Historical Med      cyclobenzaprine (FLEXERIL) 10 mg tablet Take  by mouth three (3) times daily as needed for Muscle Spasm(s). , Historical Med      Bifidobacterium Infantis (ALIGN) 4 mg cap Take  by mouth daily. , Historical Med      gabapentin (NEURONTIN) 100 mg capsule Take 100 mg by mouth three (3) times daily as needed., Historical Med      citalopram (CELEXA) 20 mg tablet Take 20 mg by mouth., Historical Med      clotrimazole-betamethasone (LOTRISONE) topical cream Apply  to affected area two (2) times a day., Normal, Disp-45 g, R-3      busPIRone (BUSPAR) 15 mg tablet Take 15 mg by mouth., Historical Med      SUMAtriptan (IMITREX) 100 mg tablet Take 100 mg by mouth once as needed., Historical Med      acyclovir (ZOVIRAX) 5 % ointment Apply  to affected area as needed., Historical Med      esomeprazole (NEXIUM) 40 mg capsule Take 40 mg by mouth daily. Indications: gastroesophageal reflux disease, Historical Med      valACYclovir (VALTREX) 1 g tablet Take 1,000 mg by mouth daily as needed.  Indications: Prevent Shingles in Patients Without a Normal Immune System, Historical Med             Past Medical History:      Past Medical History:   Diagnosis Date    Calculus of kidney     GERD (gastroesophageal reflux disease)      prn med    History of kidney stones     last one 2007    Hypertension     no medication    Migraines     controlled with med    Nausea & vomiting     med thru i[employer] helps    Psychiatric disorder     aniexty    Restless legs syndrome (RLS)      Past Surgical History:   Procedure Laterality Date    HX CHOLECYSTECTOMY      HX GYN  15 yrs ago    Ablation    HX LAP CHOLECYSTECTOMY  2000    HX TUBAL LIGATION   age 25    HX UROLOGICAL  2007    cysto     NEUROLOGICAL PROCEDURE UNLISTED  2008     cervical surg--- with screws    NEUROLOGICAL PROCEDURE UNLISTED  2002    back surg, L4-L5 herniated disk     Social History     Tobacco Use    Smoking status: Former Smoker     Packs/day: 0.50     Years: 32.00     Pack years: 16.00     Last attempt to quit: 3/17/2016     Years since quitting: 3.7    Smokeless tobacco: Never Used    Tobacco comment: off and on since age 23   Substance Use Topics    Alcohol use: No    Drug use: No     Prior to Admission Medications   Prescriptions Last Dose Informant Patient Reported? Taking? Bifidobacterium Infantis (ALIGN) 4 mg cap   Yes No   Sig: Take  by mouth daily. SUMAtriptan (IMITREX) 100 mg tablet   Yes No   Sig: Take 100 mg by mouth once as needed. acyclovir (ZOVIRAX) 5 % ointment   Yes No   Sig: Apply  to affected area as needed. busPIRone (BUSPAR) 15 mg tablet   Yes No   Sig: Take 15 mg by mouth. chlorthalidone (HYGROTEN) 25 mg tablet   Yes No   Sig: Take 12.5 mg by mouth daily. citalopram (CELEXA) 20 mg tablet   Yes No   Sig: Take 20 mg by mouth. clotrimazole-betamethasone (LOTRISONE) topical cream   No No   Sig: Apply  to affected area two (2) times a day. Patient taking differently: Apply  to affected area two (2) times daily as needed. cyclobenzaprine (FLEXERIL) 10 mg tablet   Yes No   Sig: Take  by mouth three (3) times daily as needed for Muscle Spasm(s).    esomeprazole (NEXIUM) 40 mg capsule   Yes No   Sig: Take 40 mg by mouth daily. Indications: gastroesophageal reflux disease   gabapentin (NEURONTIN) 100 mg capsule   Yes No   Sig: Take 100 mg by mouth three (3) times daily as needed. oxyCODONE-acetaminophen (PERCOCET 7.5) 7.5-325 mg per tablet   No No   Sig: Take 1 Tab by mouth every eight (8) hours as needed for Pain for up to 5 days. Max Daily Amount: 3 Tabs. valACYclovir (VALTREX) 1 g tablet   Yes No   Sig: Take 1,000 mg by mouth daily as needed.  Indications: Prevent Shingles in Patients Without a Normal Immune System      Facility-Administered Medications: None

## 2019-12-30 NOTE — ED TRIAGE NOTES
Patient with recent neck surgery on Friday morning. Swelling noted at incision site. States slight trouble breathing and hurts to swallow. Voice changed noted, per patient. Patient tearful in triage.

## 2020-01-04 LAB
BACTERIA SPEC CULT: NORMAL
BACTERIA SPEC CULT: NORMAL
SERVICE CMNT-IMP: NORMAL
SERVICE CMNT-IMP: NORMAL

## 2020-01-28 ENCOUNTER — HOSPITAL ENCOUNTER (OUTPATIENT)
Dept: GENERAL RADIOLOGY | Age: 55
Discharge: HOME | End: 2020-01-28
Payer: COMMERCIAL

## 2020-01-28 DIAGNOSIS — M48.02 CERVICAL STENOSIS OF SPINAL CANAL: ICD-10-CM

## 2020-01-28 PROCEDURE — 72040 X-RAY EXAM NECK SPINE 2-3 VW: CPT

## 2020-02-25 ENCOUNTER — HOSPITAL ENCOUNTER (OUTPATIENT)
Dept: GENERAL RADIOLOGY | Age: 55
Discharge: HOME | End: 2020-02-25
Payer: COMMERCIAL

## 2020-02-25 DIAGNOSIS — M50.30 DDD (DEGENERATIVE DISC DISEASE), CERVICAL: ICD-10-CM

## 2020-02-25 PROCEDURE — 72040 X-RAY EXAM NECK SPINE 2-3 VW: CPT

## 2020-06-09 ENCOUNTER — HOSPITAL ENCOUNTER (OUTPATIENT)
Dept: GENERAL RADIOLOGY | Age: 55
Discharge: HOME | End: 2020-06-09
Payer: COMMERCIAL

## 2020-06-09 DIAGNOSIS — M50.30 DDD (DEGENERATIVE DISC DISEASE), CERVICAL: ICD-10-CM

## 2020-06-09 DIAGNOSIS — M48.02 CERVICAL STENOSIS OF SPINAL CANAL: ICD-10-CM

## 2020-06-09 DIAGNOSIS — M54.2 NECK PAIN: ICD-10-CM

## 2020-06-09 PROBLEM — G89.29 CHRONIC INTRACTABLE HEADACHE: Status: ACTIVE | Noted: 2020-06-09

## 2020-06-09 PROBLEM — R51.9 CHRONIC INTRACTABLE HEADACHE: Status: ACTIVE | Noted: 2020-06-09

## 2020-06-09 PROCEDURE — 72040 X-RAY EXAM NECK SPINE 2-3 VW: CPT

## 2020-06-22 ENCOUNTER — HOSPITAL ENCOUNTER (OUTPATIENT)
Dept: MRI IMAGING | Age: 55
Discharge: HOME | End: 2020-06-22
Attending: NEUROLOGICAL SURGERY
Payer: COMMERCIAL

## 2020-06-22 DIAGNOSIS — M48.02 CERVICAL STENOSIS OF SPINAL CANAL: ICD-10-CM

## 2020-06-22 DIAGNOSIS — G89.29 CHRONIC INTRACTABLE HEADACHE, UNSPECIFIED HEADACHE TYPE: ICD-10-CM

## 2020-06-22 DIAGNOSIS — M50.30 DDD (DEGENERATIVE DISC DISEASE), CERVICAL: ICD-10-CM

## 2020-06-22 DIAGNOSIS — R51.9 CHRONIC INTRACTABLE HEADACHE, UNSPECIFIED HEADACHE TYPE: ICD-10-CM

## 2020-06-22 PROCEDURE — A9575 INJ GADOTERATE MEGLUMI 0.1ML: HCPCS | Performed by: NEUROLOGICAL SURGERY

## 2020-06-22 PROCEDURE — 74011250636 HC RX REV CODE- 250/636: Performed by: NEUROLOGICAL SURGERY

## 2020-06-22 PROCEDURE — 70553 MRI BRAIN STEM W/O & W/DYE: CPT

## 2020-06-22 RX ORDER — GADOTERATE MEGLUMINE 376.9 MG/ML
16 INJECTION INTRAVENOUS
Status: COMPLETED | OUTPATIENT
Start: 2020-06-22 | End: 2020-06-22

## 2020-06-22 RX ORDER — SODIUM CHLORIDE 0.9 % (FLUSH) 0.9 %
10 SYRINGE (ML) INJECTION
Status: COMPLETED | OUTPATIENT
Start: 2020-06-22 | End: 2020-06-22

## 2020-06-22 RX ADMIN — Medication 10 ML: at 20:59

## 2020-06-22 RX ADMIN — GADOTERATE MEGLUMINE 16 ML: 376.9 INJECTION INTRAVENOUS at 20:59

## 2020-06-30 PROBLEM — J32.0 CHRONIC MAXILLARY SINUSITIS: Status: ACTIVE | Noted: 2020-06-30

## 2020-10-20 ENCOUNTER — HOSPITAL ENCOUNTER (OUTPATIENT)
Dept: CT IMAGING | Age: 55
Discharge: HOME | End: 2020-10-20
Attending: OTOLARYNGOLOGY

## 2020-10-20 DIAGNOSIS — J32.9 CHRONIC SINUSITIS, UNSPECIFIED LOCATION: ICD-10-CM

## 2020-10-20 DIAGNOSIS — R51.9 FACIAL PAIN: ICD-10-CM

## 2021-10-19 ENCOUNTER — HOSPITAL ENCOUNTER (OUTPATIENT)
Dept: GENERAL RADIOLOGY | Age: 56
Discharge: HOME | End: 2021-10-19
Payer: COMMERCIAL

## 2021-10-19 DIAGNOSIS — M54.2 NECK PAIN: ICD-10-CM

## 2021-10-19 DIAGNOSIS — M48.02 CERVICAL STENOSIS OF SPINAL CANAL: ICD-10-CM

## 2021-10-19 PROCEDURE — 72040 X-RAY EXAM NECK SPINE 2-3 VW: CPT

## 2021-12-07 PROBLEM — G89.4 CHRONIC PAIN SYNDROME: Status: ACTIVE | Noted: 2021-12-07

## 2022-03-18 PROBLEM — G89.29 CHRONIC INTRACTABLE HEADACHE: Status: ACTIVE | Noted: 2020-06-09

## 2022-03-18 PROBLEM — R51.9 CHRONIC INTRACTABLE HEADACHE: Status: ACTIVE | Noted: 2020-06-09

## 2022-03-18 PROBLEM — R21 RASH OF GENITALIA: Status: ACTIVE | Noted: 2017-07-07

## 2022-03-19 PROBLEM — N39.0 URINARY TRACT INFECTION WITHOUT HEMATURIA: Status: ACTIVE | Noted: 2017-06-16

## 2022-03-19 PROBLEM — M50.30 DDD (DEGENERATIVE DISC DISEASE), CERVICAL: Status: ACTIVE | Noted: 2019-10-16

## 2022-03-19 PROBLEM — R35.0 URINARY FREQUENCY: Status: ACTIVE | Noted: 2017-07-07

## 2022-03-19 PROBLEM — N93.9 MENSTRUAL BLEEDING PROBLEM: Status: ACTIVE | Noted: 2018-07-02

## 2022-03-19 PROBLEM — J32.0 CHRONIC MAXILLARY SINUSITIS: Status: ACTIVE | Noted: 2020-06-30

## 2022-03-19 PROBLEM — G89.4 CHRONIC PAIN SYNDROME: Status: ACTIVE | Noted: 2021-12-07

## 2022-03-19 PROBLEM — R31.9 HEMATURIA: Status: ACTIVE | Noted: 2018-07-02

## 2022-03-20 PROBLEM — G89.29 CHRONIC PAIN OF RIGHT UPPER EXTREMITY: Status: ACTIVE | Noted: 2019-10-16

## 2022-03-20 PROBLEM — M79.601 CHRONIC PAIN OF RIGHT UPPER EXTREMITY: Status: ACTIVE | Noted: 2019-10-16

## 2022-03-20 PROBLEM — R32 INCONTINENCE IN FEMALE: Status: ACTIVE | Noted: 2017-06-16

## 2022-03-20 PROBLEM — M54.2 NECK PAIN: Status: ACTIVE | Noted: 2019-10-16

## 2022-03-20 PROBLEM — M48.02 CERVICAL STENOSIS OF SPINAL CANAL: Status: ACTIVE | Noted: 2019-10-16

## 2022-05-03 NOTE — H&P (VIEW-ONLY)
Name: Nikki Novak  YOB: 1965  Gender: female  MRN: [id number]    Summary: Right peroneal brevis tear, plantar fibroma. Proceed with right peroneal tendon repair with tenolysis. Popliteal saphenous block     CC: Right lateral ankle/foot pain    HPI: Nikki Novak is a 60 y.o. female who presents today with lateral ankle pain which began December 22 when she rolled her right ankle. She has been walking on it having difficult time for the past 5 months. .   They locate the pain over the lateral ankle and behind the fibula. They describe pain as a sharp, deep and tearing lateral ankle pain. They  do nothave a history of chronic ankle rolling and inversion sprains. The full story is as listed below. In 2019 she had plantars fasciitis that was treated by a podiatrist with gabapentin and she has some mild neuropathy which is being treated with Neurontin. She has chronic burning in her toes on the right side. She then had an injury to her left ankle 12/22/2021 that coincided with her injury to the right ankle. The left ankle improved but her right ankle pain lingered. She was treated by my partner Dr. Real Doyle who took MRIs of the left and right side of her foot and ankle. The left foot revealed a potential neuroma MRI, with the right foot revealing a large plantar fibroma versus a plantar fascial tear with scarring around the plantar fascial insertion. He also revealed peroneal brevis tendon tearing. She has been treated with over-the-counter nonsteroidals, prednisone, along with some alternative medications. She has altered her shoewear, ankle sleeves, and orthotics    He then referred her to me for surgical evaluation. History was obtained by patient    ROS/Meds/PSH/PMH/FH/SH: I personally reviewed the patients standard intake form. Below are the pertinents    Tobacco:  reports that she quit smoking about 6 years ago. She has a 16.00 pack-year smoking history.  She has never used smokeless tobacco.  Diabetes: None  Other: none    Physical Examination:  Right Lower Extremity: : FROM actively of toes, foot, knee and hip. 55 strength to TAEHLGSCPtib/Ant Tib. When isolating their peroneals there is  pain w resisted eversion and resisted plantarflexion. No skin lesions or ulcers. They are TTP over the peroneal tendons behind the fibula and even sub fibular. She has a tenderness over the medial aspect of her arch near the plantar fascial insertion with a plantar fibroma palpable there. It is tender. Talar tilt exam : normal  Anterior-lateral rotational drawer exam : normal    Imaging:   I reviewed imaging performed by another physician in my group. MRI of the right foot without contrast obtained on March 18, 2022 reveals a large plantar fibroma without involvement of the calcaneal tuberosity. Also shows longitudinal split tearing of the peroneal brevis tendon around the tip of the medial malleolus. Assessment:   Right Peroneal Tendonitis  Right peroneal brevis tear  Right plantar fibroma versus healing of a plantar fascial tear    Plan:   4 This is a chronic illness/condition with exacerbation and progression  Treatment at this time: Elective major surgery with procedural risk factors    Weight-bearing status: WBAT        Studies ordered: none  Return to work/work restrictions: none  none    Follow up     I discussed plantar fibroma excision and the high recurrence rate. I discussed this may be a tear versus a true fibroma. Although this is a tumor I do not recommend surgical excision. I think we should let it be. I then discussed her peroneal brevis tear. We discussed physical therapy and bracing versus surgery. I discussed surgery to be popliteal saphenous block, lateral ankle incision, repair of the tendon with potential tenolysis and excision of the diseased tendon. We made the decision to proceed with right peroneal tendon repair and tenolysis.   I discussed surgery with this patient and the risk associated with surgery. These risk include infection, delayed wound healing, hardware failure, painful hardware, recurring wounds, recurring pain, damage to surrounding structures such as nerves, vessels, tendons, ligaments, and joints. I discussed how no surgery is perfect and this surgery may not be successful. There is also risk of anesthesia such as nerve damage from local anesthetic, damage to the airway or mouth structures, respiratory distress, cardiac disease exacerbation, potential arrhythmias, and even death. The patient verbalized understanding of each of these risk and elected to proceed with surgery.

## 2022-05-18 ENCOUNTER — ANESTHESIA (OUTPATIENT)
Dept: SURGERY | Age: 57
End: 2022-05-18
Payer: COMMERCIAL

## 2022-05-18 ENCOUNTER — ANESTHESIA EVENT (OUTPATIENT)
Dept: SURGERY | Age: 57
End: 2022-05-18
Payer: COMMERCIAL

## 2022-05-18 ENCOUNTER — HOSPITAL ENCOUNTER (OUTPATIENT)
Age: 57
Setting detail: OUTPATIENT SURGERY
Discharge: HOME | End: 2022-05-18
Attending: ORTHOPAEDIC SURGERY | Admitting: ORTHOPAEDIC SURGERY
Payer: COMMERCIAL

## 2022-05-18 VITALS
DIASTOLIC BLOOD PRESSURE: 69 MMHG | SYSTOLIC BLOOD PRESSURE: 108 MMHG | BODY MASS INDEX: 34.37 KG/M2 | TEMPERATURE: 97.1 F | WEIGHT: 194 LBS | OXYGEN SATURATION: 99 % | HEART RATE: 79 BPM | RESPIRATION RATE: 18 BRPM

## 2022-05-18 LAB — POTASSIUM BLD-SCNC: 3.6 MMOL/L (ref 3.5–5.1)

## 2022-05-18 PROCEDURE — 77030021122 HC SPLNT MAT FST BSNM -A: Performed by: ORTHOPAEDIC SURGERY

## 2022-05-18 PROCEDURE — 74011250636 HC RX REV CODE- 250/636: Performed by: NURSE ANESTHETIST, CERTIFIED REGISTERED

## 2022-05-18 PROCEDURE — 77030003602 HC NDL NRV BLK BBMI -B: Performed by: ANESTHESIOLOGY

## 2022-05-18 PROCEDURE — 77030040922 HC BLNKT HYPOTHRM STRY -A: Performed by: ANESTHESIOLOGY

## 2022-05-18 PROCEDURE — 74011000250 HC RX REV CODE- 250: Performed by: ANESTHESIOLOGY

## 2022-05-18 PROCEDURE — 2709999900 HC NON-CHARGEABLE SUPPLY: Performed by: ORTHOPAEDIC SURGERY

## 2022-05-18 PROCEDURE — 76210000020 HC REC RM PH II FIRST 0.5 HR: Performed by: ORTHOPAEDIC SURGERY

## 2022-05-18 PROCEDURE — 76010000161 HC OR TIME 1 TO 1.5 HR INTENSV-TIER 1: Performed by: ORTHOPAEDIC SURGERY

## 2022-05-18 PROCEDURE — 77030002922 HC SUT FBRWRE ARTH -B: Performed by: ORTHOPAEDIC SURGERY

## 2022-05-18 PROCEDURE — 84132 ASSAY OF SERUM POTASSIUM: CPT

## 2022-05-18 PROCEDURE — 74011250636 HC RX REV CODE- 250/636: Performed by: ANESTHESIOLOGY

## 2022-05-18 PROCEDURE — 74011250636 HC RX REV CODE- 250/636: Performed by: ORTHOPAEDIC SURGERY

## 2022-05-18 PROCEDURE — 76060000033 HC ANESTHESIA 1 TO 1.5 HR: Performed by: ORTHOPAEDIC SURGERY

## 2022-05-18 PROCEDURE — 77030002982 HC SUT POLYSRB J&J -A: Performed by: ORTHOPAEDIC SURGERY

## 2022-05-18 PROCEDURE — 76942 ECHO GUIDE FOR BIOPSY: CPT | Performed by: ORTHOPAEDIC SURGERY

## 2022-05-18 PROCEDURE — 74011000250 HC RX REV CODE- 250: Performed by: NURSE ANESTHETIST, CERTIFIED REGISTERED

## 2022-05-18 PROCEDURE — 76210000063 HC OR PH I REC FIRST 0.5 HR: Performed by: ORTHOPAEDIC SURGERY

## 2022-05-18 PROCEDURE — 74011250637 HC RX REV CODE- 250/637: Performed by: ANESTHESIOLOGY

## 2022-05-18 PROCEDURE — 27658 REPAIR OF LEG TENDON EACH: CPT | Performed by: ORTHOPAEDIC SURGERY

## 2022-05-18 PROCEDURE — 76010010054 HC POST OP PAIN BLOCK: Performed by: ORTHOPAEDIC SURGERY

## 2022-05-18 PROCEDURE — 27680 RELEASE OF LOWER LEG TENDON: CPT | Performed by: ORTHOPAEDIC SURGERY

## 2022-05-18 RX ORDER — FENTANYL CITRATE 50 UG/ML
25 INJECTION, SOLUTION INTRAMUSCULAR; INTRAVENOUS ONCE
Status: COMPLETED | OUTPATIENT
Start: 2022-05-18 | End: 2022-05-18

## 2022-05-18 RX ORDER — SODIUM CHLORIDE 9 MG/ML
50 INJECTION, SOLUTION INTRAVENOUS CONTINUOUS
Status: DISCONTINUED | OUTPATIENT
Start: 2022-05-18 | End: 2022-05-18 | Stop reason: HOSPADM

## 2022-05-18 RX ORDER — FAMOTIDINE 20 MG/1
20 TABLET, FILM COATED ORAL ONCE
Status: COMPLETED | OUTPATIENT
Start: 2022-05-18 | End: 2022-05-18

## 2022-05-18 RX ORDER — SODIUM CHLORIDE 0.9 % (FLUSH) 0.9 %
5-40 SYRINGE (ML) INJECTION EVERY 8 HOURS
Status: DISCONTINUED | OUTPATIENT
Start: 2022-05-18 | End: 2022-05-18 | Stop reason: HOSPADM

## 2022-05-18 RX ORDER — PROPOFOL 10 MG/ML
INJECTION, EMULSION INTRAVENOUS AS NEEDED
Status: DISCONTINUED | OUTPATIENT
Start: 2022-05-18 | End: 2022-05-18 | Stop reason: HOSPADM

## 2022-05-18 RX ORDER — DIPHENHYDRAMINE HYDROCHLORIDE 50 MG/ML
12.5 INJECTION, SOLUTION INTRAMUSCULAR; INTRAVENOUS
Status: DISCONTINUED | OUTPATIENT
Start: 2022-05-18 | End: 2022-05-18 | Stop reason: HOSPADM

## 2022-05-18 RX ORDER — EPHEDRINE SULFATE/0.9% NACL/PF 50 MG/5 ML
SYRINGE (ML) INTRAVENOUS AS NEEDED
Status: DISCONTINUED | OUTPATIENT
Start: 2022-05-18 | End: 2022-05-18 | Stop reason: HOSPADM

## 2022-05-18 RX ORDER — SODIUM CHLORIDE 0.9 % (FLUSH) 0.9 %
5-40 SYRINGE (ML) INJECTION AS NEEDED
Status: DISCONTINUED | OUTPATIENT
Start: 2022-05-18 | End: 2022-05-18 | Stop reason: HOSPADM

## 2022-05-18 RX ORDER — BUPIVACAINE HYDROCHLORIDE AND EPINEPHRINE 2.5; 5 MG/ML; UG/ML
INJECTION, SOLUTION EPIDURAL; INFILTRATION; INTRACAUDAL; PERINEURAL
Status: COMPLETED | OUTPATIENT
Start: 2022-05-18 | End: 2022-05-18

## 2022-05-18 RX ORDER — HYDROMORPHONE HYDROCHLORIDE 2 MG/ML
0.5 INJECTION, SOLUTION INTRAMUSCULAR; INTRAVENOUS; SUBCUTANEOUS
Status: DISCONTINUED | OUTPATIENT
Start: 2022-05-18 | End: 2022-05-18 | Stop reason: HOSPADM

## 2022-05-18 RX ORDER — LIDOCAINE HYDROCHLORIDE 20 MG/ML
INJECTION, SOLUTION EPIDURAL; INFILTRATION; INTRACAUDAL; PERINEURAL
Status: COMPLETED | OUTPATIENT
Start: 2022-05-18 | End: 2022-05-18

## 2022-05-18 RX ORDER — CHLORTHALIDONE 25 MG/1
25 TABLET ORAL DAILY
COMMUNITY

## 2022-05-18 RX ORDER — SODIUM CHLORIDE, SODIUM LACTATE, POTASSIUM CHLORIDE, CALCIUM CHLORIDE 600; 310; 30; 20 MG/100ML; MG/100ML; MG/100ML; MG/100ML
75 INJECTION, SOLUTION INTRAVENOUS CONTINUOUS
Status: DISCONTINUED | OUTPATIENT
Start: 2022-05-18 | End: 2022-05-18 | Stop reason: HOSPADM

## 2022-05-18 RX ORDER — ONDANSETRON 2 MG/ML
INJECTION INTRAMUSCULAR; INTRAVENOUS
Status: DISCONTINUED
Start: 2022-05-18 | End: 2022-05-18 | Stop reason: HOSPADM

## 2022-05-18 RX ORDER — MIDAZOLAM HYDROCHLORIDE 1 MG/ML
2 INJECTION, SOLUTION INTRAMUSCULAR; INTRAVENOUS ONCE
Status: COMPLETED | OUTPATIENT
Start: 2022-05-18 | End: 2022-05-18

## 2022-05-18 RX ORDER — ONDANSETRON 2 MG/ML
4 INJECTION INTRAMUSCULAR; INTRAVENOUS ONCE
Status: COMPLETED | OUTPATIENT
Start: 2022-05-18 | End: 2022-05-18

## 2022-05-18 RX ORDER — CEFAZOLIN SODIUM/WATER 2 G/20 ML
2 SYRINGE (ML) INTRAVENOUS ONCE
Status: COMPLETED | OUTPATIENT
Start: 2022-05-18 | End: 2022-05-18

## 2022-05-18 RX ORDER — LIDOCAINE HYDROCHLORIDE 20 MG/ML
INJECTION, SOLUTION EPIDURAL; INFILTRATION; INTRACAUDAL; PERINEURAL AS NEEDED
Status: DISCONTINUED | OUTPATIENT
Start: 2022-05-18 | End: 2022-05-18 | Stop reason: HOSPADM

## 2022-05-18 RX ORDER — MIDAZOLAM HYDROCHLORIDE 1 MG/ML
2 INJECTION, SOLUTION INTRAMUSCULAR; INTRAVENOUS
Status: DISCONTINUED | OUTPATIENT
Start: 2022-05-18 | End: 2022-05-18 | Stop reason: HOSPADM

## 2022-05-18 RX ORDER — BUPIVACAINE HYDROCHLORIDE AND EPINEPHRINE 5; 5 MG/ML; UG/ML
INJECTION, SOLUTION EPIDURAL; INTRACAUDAL; PERINEURAL
Status: COMPLETED | OUTPATIENT
Start: 2022-05-18 | End: 2022-05-18

## 2022-05-18 RX ORDER — LIDOCAINE HYDROCHLORIDE 10 MG/ML
0.1 INJECTION INFILTRATION; PERINEURAL AS NEEDED
Status: DISCONTINUED | OUTPATIENT
Start: 2022-05-18 | End: 2022-05-18 | Stop reason: HOSPADM

## 2022-05-18 RX ORDER — PROPOFOL 10 MG/ML
INJECTION, EMULSION INTRAVENOUS
Status: DISCONTINUED | OUTPATIENT
Start: 2022-05-18 | End: 2022-05-18 | Stop reason: HOSPADM

## 2022-05-18 RX ORDER — SODIUM CHLORIDE, SODIUM LACTATE, POTASSIUM CHLORIDE, CALCIUM CHLORIDE 600; 310; 30; 20 MG/100ML; MG/100ML; MG/100ML; MG/100ML
100 INJECTION, SOLUTION INTRAVENOUS CONTINUOUS
Status: DISCONTINUED | OUTPATIENT
Start: 2022-05-18 | End: 2022-05-18 | Stop reason: HOSPADM

## 2022-05-18 RX ORDER — OXYCODONE AND ACETAMINOPHEN 5; 325 MG/1; MG/1
1 TABLET ORAL AS NEEDED
Status: DISCONTINUED | OUTPATIENT
Start: 2022-05-18 | End: 2022-05-18 | Stop reason: HOSPADM

## 2022-05-18 RX ORDER — OXYCODONE HYDROCHLORIDE 5 MG/1
5 TABLET ORAL
Status: COMPLETED | OUTPATIENT
Start: 2022-05-18 | End: 2022-05-18

## 2022-05-18 RX ADMIN — PHENYLEPHRINE HYDROCHLORIDE 100 MCG: 10 INJECTION INTRAVENOUS at 09:42

## 2022-05-18 RX ADMIN — Medication 10 MG: at 09:53

## 2022-05-18 RX ADMIN — SODIUM CHLORIDE, POTASSIUM CHLORIDE, SODIUM LACTATE AND CALCIUM CHLORIDE 75 ML/HR: 600; 310; 30; 20 INJECTION, SOLUTION INTRAVENOUS at 07:15

## 2022-05-18 RX ADMIN — PHENYLEPHRINE HYDROCHLORIDE 100 MCG: 10 INJECTION INTRAVENOUS at 09:46

## 2022-05-18 RX ADMIN — PROPOFOL 40 MG: 10 INJECTION, EMULSION INTRAVENOUS at 09:27

## 2022-05-18 RX ADMIN — BUPIVACAINE HYDROCHLORIDE AND EPINEPHRINE 5 ML: 5; 5 INJECTION, SOLUTION EPIDURAL; INTRACAUDAL; PERINEURAL at 07:53

## 2022-05-18 RX ADMIN — Medication 10 MG: at 09:41

## 2022-05-18 RX ADMIN — OXYCODONE HYDROCHLORIDE 5 MG: 5 TABLET ORAL at 11:04

## 2022-05-18 RX ADMIN — BUPIVACAINE HYDROCHLORIDE AND EPINEPHRINE 5 ML: 2.5; 5 INJECTION, SOLUTION EPIDURAL; INFILTRATION; INTRACAUDAL; PERINEURAL at 07:53

## 2022-05-18 RX ADMIN — LIDOCAINE HYDROCHLORIDE 5 ML: 20 INJECTION, SOLUTION EPIDURAL; INFILTRATION; INTRACAUDAL; PERINEURAL at 07:53

## 2022-05-18 RX ADMIN — PROPOFOL 20 MG: 10 INJECTION, EMULSION INTRAVENOUS at 07:46

## 2022-05-18 RX ADMIN — MIDAZOLAM HYDROCHLORIDE 2 MG: 1 INJECTION, SOLUTION INTRAMUSCULAR; INTRAVENOUS at 07:46

## 2022-05-18 RX ADMIN — Medication 2 G: at 09:30

## 2022-05-18 RX ADMIN — PHENYLEPHRINE HYDROCHLORIDE 100 MCG: 10 INJECTION INTRAVENOUS at 09:52

## 2022-05-18 RX ADMIN — PHENYLEPHRINE HYDROCHLORIDE 50 MCG: 10 INJECTION INTRAVENOUS at 09:38

## 2022-05-18 RX ADMIN — Medication 10 MG: at 10:01

## 2022-05-18 RX ADMIN — BUPIVACAINE HYDROCHLORIDE AND EPINEPHRINE BITARTRATE 15 ML: 2.5; .005 INJECTION, SOLUTION EPIDURAL; INFILTRATION; INTRACAUDAL; PERINEURAL at 07:50

## 2022-05-18 RX ADMIN — PROPOFOL 140 MCG/KG/MIN: 10 INJECTION, EMULSION INTRAVENOUS at 09:27

## 2022-05-18 RX ADMIN — PHENYLEPHRINE HYDROCHLORIDE 50 MCG: 10 INJECTION INTRAVENOUS at 09:35

## 2022-05-18 RX ADMIN — PHENYLEPHRINE HYDROCHLORIDE 100 MCG: 10 INJECTION INTRAVENOUS at 09:55

## 2022-05-18 RX ADMIN — PHENYLEPHRINE HYDROCHLORIDE 100 MCG: 10 INJECTION INTRAVENOUS at 10:20

## 2022-05-18 RX ADMIN — PHENYLEPHRINE HYDROCHLORIDE 100 MCG: 10 INJECTION INTRAVENOUS at 10:08

## 2022-05-18 RX ADMIN — FAMOTIDINE 20 MG: 20 TABLET, FILM COATED ORAL at 07:40

## 2022-05-18 RX ADMIN — LIDOCAINE HYDROCHLORIDE 5 ML: 20 INJECTION, SOLUTION EPIDURAL; INFILTRATION; INTRACAUDAL; PERINEURAL at 07:50

## 2022-05-18 RX ADMIN — Medication 10 MG: at 09:49

## 2022-05-18 RX ADMIN — LIDOCAINE HYDROCHLORIDE 40 MG: 20 INJECTION, SOLUTION EPIDURAL; INFILTRATION; INTRACAUDAL; PERINEURAL at 09:27

## 2022-05-18 RX ADMIN — BUPIVACAINE HYDROCHLORIDE AND EPINEPHRINE BITARTRATE 15 ML: 5; .005 INJECTION, SOLUTION EPIDURAL; INTRACAUDAL; PERINEURAL at 07:50

## 2022-05-18 RX ADMIN — FENTANYL CITRATE 25 MCG: 50 INJECTION, SOLUTION INTRAMUSCULAR; INTRAVENOUS at 07:46

## 2022-05-18 RX ADMIN — ONDANSETRON 4 MG: 2 INJECTION INTRAMUSCULAR; INTRAVENOUS at 11:04

## 2022-05-18 RX ADMIN — PHENYLEPHRINE HYDROCHLORIDE 100 MCG: 10 INJECTION INTRAVENOUS at 10:02

## 2022-05-18 NOTE — PERIOP NOTES
Discharge instructions reviewed with family and patient at bedside. All questions answered at this time. Handout in hand on family.       Verbal orders for zofran taken from Dr. Catrina Victor

## 2022-05-18 NOTE — INTERVAL H&P NOTE
Update History & Physical    The Patient's History and Physical was reviewed   I discussed the surgery and patients medical condition with the patient. The chart was reviewed with the patient and I examined the patient. There was no change. The surgical site was confirmed by the patient and me. CV: RRR  RESP: CTAB    Plan:  The risk, benefits, expected outcome, and alternative to the recommended procedure have been discussed with the patient. Patient understands and wants to proceed with the procedure.     Electronically signed by Lionel Quintana MD on 05/18/22 6:55 AM

## 2022-05-18 NOTE — DISCHARGE INSTRUCTIONS
INSTRUCTIONS FOLLOWING FOOT SURGERY    ACTIVITY  Elevate foot. No Ice    1.)No weight bearing. Use crutches or knee walker until seen in follow up appointment    2.)Protected partial weight bearing on the heel only as tolerated in post op shoe after full sensation returns. Blood clot prevention:  As instructed by Dr Cortney Sofia: Take 81mg aspirin twice daily if ok with your medical doctor and you have no GI Ulcer. Get up and out of bed frequently. While in bed move the legs as much as possible. DRESSING CARE Keep dry and in place until follow up appointment. Cover with cast bag or plastic bag when showering. Let the office know if dressing gets saturated with water. Don't put anything into the splint to relieve itching etc.     CALL YOUR DOCTOR IF YOU HAVE  Excessive bleeding that does not stop after holding mild pressure over the area. Temperature of 101 degrees or above. Redness, excessive swelling or bruising, and/or green or yellow, smelly discharge from incision. Loss of sensation - cold, white or blue toes. DIET  Day of Surgery: Clear liquids until no nausea or vomiting; then light, bland diet (Baked chicken, plain rice, grits, scrambled egg, toast). Nothing Greasy, fried or spicy today  Advance to regular diet on second day, unless your doctor orders otherwise. PAIN  Take pain medications as directed by your doctor. Call your doctor if pain is NOT relieved by medication. MEDICATION INTERACTION:  During your procedure you potentially received a medication or medications which may reduce the effectiveness of oral contraceptives. Please consider other forms of contraception for 1 month following your procedure if you are currently using oral contraceptives as your primary form of birth control.  In addition to this, we recommend continuing your oral contraceptive as prescribed, unless otherwise instructed by your physician, during this time    After general anesthesia or intravenous sedation, for 24 hours or while taking prescription Narcotics:  · Limit your activities  · A responsible adult needs to be with you for the next 24 hours  · Do not drive and operate hazardous machinery  · Do not make important personal or business decisions  · Do not drink alcoholic beverages  · If you have not urinated within 8 hours after discharge, and you are experiencing discomfort from urinary retention, please go to the nearest ED. · If you have sleep apnea and have a CPAP machine, please use it for all naps and sleeping. · Please use caution when taking narcotics and any of your home medications that may cause drowsiness. *  Please give a list of your current medications to your Primary Care Provider. *  Please update this list whenever your medications are discontinued, doses are      changed, or new medications (including over-the-counter products) are added. *  Please carry medication information at all times in case of emergency situations. These are general instructions for a healthy lifestyle:  No smoking/ No tobacco products/ Avoid exposure to second hand smoke  Surgeon General's Warning:  Quitting smoking now greatly reduces serious risk to your health. Obesity, smoking, and sedentary lifestyle greatly increases your risk for illness  A healthy diet, regular physical exercise & weight monitoring are important for maintaining a healthy lifestyle    You may be retaining fluid if you have a history of heart failure or if you experience any of the following symptoms:  Weight gain of 3 pounds or more overnight or 5 pounds in a week, increased swelling in our hands or feet or shortness of breath while lying flat in bed. Please call your doctor as soon as you notice any of these symptoms; do not wait until your next office visit. Learning About How to Use Crutches  Your Care Instructions  Crutches can help you walk when you have an injured hip, leg, knee, ankle, or foot.  Your doctor will tell you how much weight--if any--you can put on your leg. Be sure your crutches fit you. When you stand up in your normal posture, there should be space for two or three fingers between the top of the crutch and your armpit. When you let your hands hang down, the hand  should be at your wrists. When you put your hands on the hand , your elbows should be slightly bent. To stay safe when using crutches:  · Look straight ahead, not down at your feet. · Clear away small rugs, cords, or anything else that could cause you to trip, slip, or fall. · Be very careful around pets and small children. They can get in your path when you least expect it. · Be sure the rubber tips on your crutches are clean and in good condition to help prevent slipping. · Avoid slick conditions, such as wet floors and snowy or icy driveways. In bad weather, be especially careful on curbs and steps. How to use crutches  Getting ready to walk    1. Bend your elbows slightly. Press the padded top parts of the crutches against your sides, under your armpits. 2. If you have been told not to put any weight on your injured leg, keep that leg bent and off the ground. Walking with crutches    1. Put both crutches about 12 inches in front of you. 2. Put your weight on the handgrips, not on the pads under your arms. (Constant pressure against your underarms can cause numbness.) Swing your body forward. (If you have been told not to put any weight on your injured leg, keep that leg bent and off the ground.)  3. To complete the step, put your weight on the strong leg. 4. Move your crutches about 12 inches in front of you, and start the next step. 5. When you're confident using the crutches, you can move the crutches and your injured leg at the same time. Then push straight down on the crutches as you step past the crutches with your strong leg, as you would in normal walking. 6. Take small steps. 7. Use ramps and elevators when you can.   Sitting down    1. To sit, back up to the chair. Use one hand to hold both crutches by the handgrips, beside your injured leg. With the other hand, hold onto the seat and slowly lower yourself onto the chair. 2. Lay the crutches on the ground near your chair. If you prop them up, they may fall over. Getting up from a chair    1. To get up from a chair,  the crutches and put them in one hand beside your injured leg. 2. Put your weight on the handgrips of the crutches and on your strong leg to stand up. Walking up stairs    1. To go up stairs, step up with your strong leg and then bring the crutches and your injured leg to the upper step. 2. For stairs that have handrails: Put both crutches under the arm opposite the handrail. Use the hand opposite the handrail to hold both crutches by the handgrips. 3. Hold onto the handrail as you go up. Put your strong leg on the step first when you go up. Walking down stairs    1. To go down stairs, put your crutches and injured leg on the lower step. 2. Bring your strong leg to the lower step. This saying may help you remember: \"Up with the good, down with the bad. \"  3. For stairs that have handrails: Put both crutches under the arm opposite the handrail. Use the hand opposite the handrail to hold both crutches by the handgrips. Hold onto the handrail as you go down. Follow the same process you use for stairs: Lead with your crutches and injured leg on the way down.

## 2022-05-18 NOTE — OP NOTES
Operative Note    Lynette Caicedo  MRN: [id number]    Date Of Surgery: 5/18/2022    Surgeon: Franny Ramos MD    Assistant Surgeon: None    Procedure Performed:   right  Tenosynovectomy and tenolysis of the peroneal longus tendon  Repair of the peroneal brevis tendon tear    Pre Op Diagnosis:  Right peroneal brevis tear  Right peroneal tendinitis    Post Op Diagnosis:   same    Implants:   * No implants in log *    Anesthesia:  Regional    Blood Loss:  5cc    Tourniquet:  Estimated calf tourniquet at 250 mmHg 30 minutes    Complications:  -    Pre Operative Abx:   Ancef 2g    Specimens/Cultures:  -    Significant Findings:  1-significant inflammation and scarring around the peroneal longus tendon. No tearing  2-peroneal brevis tendon tear: 2 distinct tears in the peroneal brevis tendon. The tear extended from behind the fibula down to the peroneal tubercle. Is approximately 4 cm in length. There was to complete tears. Pre Operative Course:  Zack Villanueva is a 60 y.o. female who sustained an injury to her right ankle in December 2022. She was treated initially nonoperatively as a difficult time walking and had pain. An MRI was ordered and showed a peroneal brevis tear. I discussion with her decision was made for surgical fixation. Operation In Detail:  Patient was evaluated in the preoperative area. The right lower extremity was marked by me. We had a long discussion about the procedure and postoperative protocols. The patient was then brought back to the operating room suite and placed in the operating room table. A timeout was taken to identify the patient, procedure being performed, and laterality. After this the patient was prepped and draped in the normal sterile fashion using a Betadine solution and/or a ChloraPrep solution. A timeout was then taken to identify the patient his name, date of birth, laterality, and procedure being performed.   We also identified allergies and any concerns about the operation. Attention was then placed to the operative extremity. The operative leg was identified. Using a 15 blade I cute through the skin on the posterior 1/3 of the fibula, extending distally over the course of the peroneal tendons. I sharply dissected down to the level of the lateral ankle ligaments and the peroneal tendon sheath. I incised the peroneal tendon sheath behind the distal fibula and inspected both tendons. The Peroneal Longus tendon was first identified and it had no tears. There was a significant amount of scarring. . There was a lot of inflamed tenosynovium around it. Using scissors and a knife I performed a tenosynovectomy and tenolysis of the peroneal longus tendon. I removed all the inflamed tissue. The Peroneal Brevis tendon was first identified and it had 1 long 4 cm tear with multiple tears within it. There is to complete tears of the tendon within the long tear. I excised the central slip of torn tendon. This removed the large port of damage and unhealthy tendon tearing. I then used a Vicryl and sewed the back half of the tendon together using a running Vicryl stitch. I buried the knot deep in the tendon. I then took a 3-0 FiberWire and repair the more longitudinal longer tear by performing tubularization. I made sure I buried the knot deep within the tendon. After repairing 1 tear of this peroneal brevis with a Vicryl and then tubularized in it with a FiberWire I felt that it was appropriate the repaired. There was a low-lying muscle belly with some mild inflamed tenosynovium around it. I debrided and performed a tenolysis of this peroneal brevis tendon with a knife and scissors. I irrigated out the peroneal tendons and then repaired the proximal peroneal tenon sheath with a vicryl suture.   Since I had to make my incision behind the fibula I repaired the peroneal retinaculum with 2 separate 3-0 FiberWire sutures and then reinforced with 2-0 Vicryl suture. I range the ankle and there is no signs of peroneal tendon subluxation or dislocation. The tendon moved well    I irrigated out the wound with sterile saline-betadine solution, closed the skin with monocryl sub q and nylon for the skin. A sterile dressing was then applied to the leg and Posterior slab splint with Stirrups. They were awoken from anesthesia and returned to the PACU without difficulty.     Post Operative Plan:   1- WB status: Non-Weight Bearing   2- Follow Up: 2-3 weeks  3- Immobilization/assistive devices: brace/splint  4- DVT px: ASA 81 mg BID  5- Pain Medication: Percocet 5mg/325 q6 PRN pain #30

## 2022-05-18 NOTE — ANESTHESIA PREPROCEDURE EVALUATION
Relevant Problems   NEUROLOGY   (+) Chronic intractable headache       Anesthetic History     PONV          Review of Systems / Medical History  Patient summary reviewed and pertinent labs reviewed    Pulmonary              Pertinent negatives: Smoker: ex.     Neuro/Psych         Headaches     Cardiovascular    Hypertension: well controlled              Exercise tolerance: >4 METS     GI/Hepatic/Renal     GERD: well controlled    Renal disease: stones       Endo/Other        Obesity     Other Findings   Comments: RLS           Physical Exam    Airway  Mallampati: I  TM Distance: 4 - 6 cm  Neck ROM: normal range of motion   Mouth opening: Normal     Cardiovascular  Regular rate and rhythm,  S1 and S2 normal,  no murmur, click, rub, or gallop  Rhythm: regular  Rate: normal         Dental    Dentition: Lower braces and Caps/crowns     Pulmonary  Breath sounds clear to auscultation               Abdominal  GI exam deferred       Other Findings            Anesthetic Plan    ASA: 2  Anesthesia type: total IV anesthesia      Post-op pain plan if not by surgeon: peripheral nerve block single    Induction: Intravenous  Anesthetic plan and risks discussed with: Patient

## 2022-05-18 NOTE — ANESTHESIA PROCEDURE NOTES
Peripheral Block    Start time: 5/18/2022 7:51 AM  End time: 5/18/2022 7:53 AM  Performed by: Yeu Bunch MD  Authorized by: Yue Bunch MD       Pre-procedure: Indications: at surgeon's request and post-op pain management    Preanesthetic Checklist: patient identified, risks and benefits discussed, site marked, timeout performed, anesthesia consent given and patient being monitored    Timeout Time: 07:51 EDT          Block Type:   Block Type:   Adductor canal block  Laterality:  Right  Monitoring:  Standard ASA monitoring, continuous pulse ox, frequent vital sign checks, heart rate and oxygen  Injection Technique:  Single shot  Procedures: ultrasound guided and nerve stimulator    Patient Position: left lateral decubitus  Prep: chlorhexidine    Location:  Mid thigh  Needle Type:  Stimuplex  Needle Gauge:  22 G  Needle Localization:  Nerve stimulator and ultrasound guidance  Motor Response comment:   Motor Response: minimal motor response >0.4 mA   Medication Injected:  Bupivacaine 0.25% -EPINEPHrine 1:200,000 (SENSORCAINE) mg injection, 5 mL  bupivacaine-EPINEPHrine (PF)(SENSORCAINE) 0.5%-1:200,000 mg injection, 5 mL  lidocaine (XYLOCAINE) Preserv-Free 2% injection, 5 mL  Med Admin Time: 5/18/2022 7:53 AM    Assessment:  Number of attempts:  1  Injection Assessment:  Local visualized surrounding nerve on ultrasound, negative aspiration for blood, no paresthesia, no intravascular symptoms, ultrasound image on chart and incremental injection every 5 mL  Patient tolerance:  Patient tolerated the procedure well with no immediate complications

## 2022-05-18 NOTE — ANESTHESIA PROCEDURE NOTES
Peripheral Block    Start time: 5/18/2022 7:46 AM  End time: 5/18/2022 7:50 AM  Performed by: Wilhelminia Blizzard, MD  Authorized by: Wilhelminia Blizzard, MD       Pre-procedure:    Indications: at surgeon's request and post-op pain management    Preanesthetic Checklist: patient identified, risks and benefits discussed, site marked, timeout performed, anesthesia consent given and patient being monitored    Timeout Time: 07:46 EDT          Block Type:   Block Type:  Popliteal  Laterality:  Right  Monitoring:  Standard ASA monitoring, continuous pulse ox, frequent vital sign checks, heart rate, oxygen and responsive to questions  Injection Technique:  Single shot  Procedures: ultrasound guided and nerve stimulator    Patient Position: left lateral decubitus  Prep: chlorhexidine    Location:  Mid thigh  Needle Type:  Stimuplex  Needle Gauge:  22 G  Needle Localization:  Nerve stimulator and ultrasound guidance  Motor Response comment:   Motor Response: minimal motor response >0.4 mA   Medication Injected:  Bupivacaine 0.25% -EPINEPHrine 1:200,000 (SENSORCAINE) mg injection, 15 mL  bupivacaine-EPINEPHrine (PF)(SENSORCAINE) 0.5%-1:200,000 mg injection, 15 mL  lidocaine (XYLOCAINE) Preserv-Free 2% injection, 5 mL  Med Admin Time: 5/18/2022 7:50 AM    Assessment:  Number of attempts:  1  Injection Assessment:  Local visualized surrounding nerve on ultrasound, negative aspiration for blood, no paresthesia, no intravascular symptoms, ultrasound image on chart and incremental injection every 5 mL  Patient tolerance:  Patient tolerated the procedure well with no immediate complications

## 2022-05-18 NOTE — ANESTHESIA POSTPROCEDURE EVALUATION
Procedure(s):  RIGHT PERONEAL TENDON REPAIR .    total IV anesthesia    Anesthesia Post Evaluation      Multimodal analgesia: multimodal analgesia used between 6 hours prior to anesthesia start to PACU discharge  Patient location during evaluation: bedside  Patient participation: complete - patient participated  Level of consciousness: awake  Pain score: 0  Pain management: adequate  Airway patency: patent  Anesthetic complications: no  Cardiovascular status: acceptable and stable  Respiratory status: acceptable and room air  Hydration status: acceptable  Post anesthesia nausea and vomiting:  controlled  Final Post Anesthesia Temperature Assessment:  Normothermia (36.0-37.5 degrees C)      INITIAL Post-op Vital signs:   Vitals Value Taken Time   /64 05/18/22 1049   Temp 36.3 °C (97.3 °F) 05/18/22 1034   Pulse 83 05/18/22 1049   Resp 18 05/18/22 1049   SpO2 92 % 05/18/22 1049   Vitals shown include unvalidated device data.

## 2022-05-31 DIAGNOSIS — J32.0 CHRONIC MAXILLARY SINUSITIS: Primary | ICD-10-CM

## 2022-05-31 RX ORDER — OXYCODONE HYDROCHLORIDE AND ACETAMINOPHEN 5; 325 MG/1; MG/1
1 TABLET ORAL EVERY 6 HOURS PRN
Qty: 30 TABLET | Refills: 0 | Status: SHIPPED | OUTPATIENT
Start: 2022-05-31 | End: 2022-06-05

## 2022-06-03 ENCOUNTER — OFFICE VISIT (OUTPATIENT)
Dept: ORTHOPEDIC SURGERY | Age: 57
End: 2022-06-03

## 2022-06-03 DIAGNOSIS — M76.71 PERONEAL TENDINITIS OF RIGHT LOWER EXTREMITY: Primary | ICD-10-CM

## 2022-06-03 PROCEDURE — 99024 POSTOP FOLLOW-UP VISIT: CPT | Performed by: ORTHOPAEDIC SURGERY

## 2022-06-03 NOTE — PROGRESS NOTES
Name: Desi Jordan  YOB: 1965  Gender: female  MRN: [id number]      Procedure: Repair  Brevis tendon    Surgery Date: 5/18/2022    Subjective: Denies fevers chills or infection. Denies any signs of spreading erythema. Her splint got wet so. Presents today for, her splint change. Exam: Wound healing appropriately. No signs of dehiscence or infection. No signs of erythema or drainage. Neurovascularly intact of the foot. With toes warm and well-perfused. Imaging:   No imaging reviewed      Assessment/Plan:    Bracing: Placed in: 3D boot    Studies ordered: NO XR needed @ Next Visit    Weight-bearing status: WBAT in Boot/hardsole shoe          Return to work/work restrictions: none  none    Follow up No follow-up provider specified.

## 2022-06-06 ENCOUNTER — TELEPHONE (OUTPATIENT)
Dept: ORTHOPEDIC SURGERY | Age: 57
End: 2022-06-06

## 2022-06-06 NOTE — TELEPHONE ENCOUNTER
We saw her Friday I cancelled her apt for tomorrow we need to see  her back in 3 weeks from Friday please call patient and make an apt thank you

## 2022-06-06 NOTE — TELEPHONE ENCOUNTER
Patient has a post op apt tomorrow 6/7 at 2:40 but says she needs to be scheduled out for a 3wk f/u apt instead?

## 2022-06-23 ENCOUNTER — OFFICE VISIT (OUTPATIENT)
Dept: ORTHOPEDIC SURGERY | Age: 57
End: 2022-06-23
Payer: COMMERCIAL

## 2022-06-23 DIAGNOSIS — M76.71 PERONEAL TENDINITIS OF RIGHT LOWER EXTREMITY: Primary | ICD-10-CM

## 2022-06-23 PROCEDURE — 99024 POSTOP FOLLOW-UP VISIT: CPT | Performed by: ORTHOPAEDIC SURGERY

## 2022-06-23 PROCEDURE — L1902 AFO ANKLE GAUNTLET PRE OTS: HCPCS | Performed by: ORTHOPAEDIC SURGERY

## 2022-06-23 NOTE — LETTER
DME Patient Authorization Form    Name: Janny Hugo  : 1965  MRN: [id number]   Age: 60 y.o. Gender: female  Delivery Address: [facility]     Diagnosis:     ICD-10-CM    1. Peroneal tendinitis of right lower extremity  M76.71 Wraptor Ankle Brace ()     [facility], [address] [place] [address] [facility]        Requested DME:  wraptor ankle brace        Clinical Notes:     **Indicates non-covered items by insurance. Payment expected on date of service. Electronically signed by  Provider: _______________________________ Date: 2022                             [facility] Tax ID # [id number]        Durable Medical Equipment and/or Orthotics Patient Consent     I understand that my physician has prescribed this medical supply as part of my treatment plan as a matter of Medical Necessity.  I understand that I have a choice in where I receive my prescribed orthopedic supplies and/or services.  I authorize [facility] to furnish this service/product and to provide my insurance carrier with any information requested in order to process for payment.  I instruct my insurance carrier to pay [facility] directly for these services/products.  I understand that my insurance carrier may deny payment for this supply because it is a non-covered item, deemed not medically necessary or considered experimental.   I understand that any cost not covered by my insurance carrier will be solely my financial responsibility.  I have received the Supplier Standards and have reviewed them.  I have received the prescribed item and have been fully instructed on the proper use of the above services/products.    ______ (Patient Initials) I understand that all DME items are non-returnable after being dispensed.  Items still in sealed packaging may be returned up to 14 days after purchasing. [address] will replace items that are defective.    ______ (Patient Initials) I understand that [facility] will not file a claim with my insurance carrier for this service/product and I am waiving my right to file a claim on my own for this service/product with my insurance company as this item is NON-COVERED (Denoted by the **) by my Insurance company/policy. ______ (Patient Initials) I understand that I am responsible to bring my equipment to the hospital for any surgery. ______________________________________________  ________________________  Patient / Maria M Board            Thank you for considering [address] Your physician has prescribed specific medical equipment or devices for your home use. The following describes your rights and responsibilities as our customer. Right to Choose Providers: You have a choice regarding which company supplies your home medical equipment and devices, and to consult your physician in this decision. You may choose a medical supply store, a home medical equipment provider, or a specialist such as [facility]. [facility] will coordinate with your physician to provide the medical equipment or devices prescribed for your home use. Right to Service:  You have the right to considerate, respectful and nondiscriminatory care. You have the right to receive accurate and easily understood information about your health care. If you speak a foreign language, or don't understand the discussions, assistance will be provided to allow you to make informed health care decisions. You have the right to know your treatment options and to participate in decisions about your care, including the right to accept or refuse treatment.   You have the right to expect a reasonable response to your requests for treatment or service. You have the right to talk in confidence with health care providers and to have your health care information protected. You have the right to receive an explanation of your bill. You have the right to complain about the service or product you receive. Patient Responsibilities:  Please provide complete and accurate information about your health insurance benefits and make arrangements for the timely payment of your bill. [facility] will, if possible, assume responsibility for billing your insurance (Medicare, Medicaid and commercial) for the prescribed equipment or devices. If your policy does not cover the prescribed product, or only covers a portion of the bill, you are responsible for any remaining balance. Return and Exchange Policy:  [facility] will honor published  Warranties for products. [facility] will accept returns or exchanges within 14 days from the date of receipt, providin) the product must be in new condition; 2) receipt as required; and 3) used disposable and hygiene products may only be returned due to a defective product. Note: Refunds will be issued in a timely manner, please allow 4-6 weeks for processing. Complaint Procedures and DME Consumer Protection Resources:  [facility] values you as a customer, and is committed to resolving patient concerns. This commitment includes understanding and documenting your concerns, conducting a review of internal procedures, and providing you with an explanation and resolution to your concerns. Should you have any questions about our services or billing process, please contact our office at (practice phone number). If we are unable to resolve the concern, you have the right to direct comments to the office of Consumer Protection, in the [address] or the [facility] office, without fear of repercussion.     DMEPOS SUPPLIER STANDARDS    A supplier must be in compliance with all applicable Federal and State licensure and regulatory requirements. A supplier must provide complete and accurate information on the DMEPOS supplier application. Any changes to this information must be reported to the [facility] within 30 days. An authorized individual (one whose signature is binding) must sign the application for billing privileges. A supplier must fill orders from its own inventory, or must contract with other companies for the purchase of items necessary to fill the order. A supplier may not contract with any entity that is currently excluded from the Medicare program, any [facility] [place], operated by [facility] program, or from any other Federal procurement or Nonprocurement programs. A supplier must advise beneficiaries that they may rent or purchase inexpensive or routinely purchased durable medical equipment, and of the purchase option for capped rental equipment. A supplier must notify beneficiaries of warranty coverage and honor all warranties under applicable State Law, and repair or replace free of charge Medicare covered items that are under warranty. A supplier must maintain a physical facility on an appropriate site. A supplier must permit CMS, or its agents to conduct on-site inspections to ascertain the supplier's compliance with these standards. The supplier location must be accessible to beneficiaries during reasonable business hours, and must maintain a visible sign and posted hours of operation. A supplier must maintain a primary business telephone listed under the name of the business in a [employer] or a toll free number available through directory assistance. The exclusive use of a beeper, answering machine or cell phone is prohibited. A supplier must have comprehensive liability insurance in the amount of at least $300,000 that covers both the supplier's place of business and all customers and employees of the supplier.   If the supplier manufactures its own items, this insurance must also cover product liability and completed operations. A supplier must agree not to initiate telephone contact with beneficiaries, with a few exceptions allowed. This standard prohibits suppliers from calling beneficiaries in order to solicit new business. A supplier is responsible for delivery and must instruct beneficiaries on use of Medicare covered items, and maintain proof of delivery. A supplier must answer questions, and respond to complaints of the beneficiaries, and maintain documentation of such contacts. A supplier must maintain and replace at no charge or repair directly, or through a service contract with another company, Medicare covered items it has rented to beneficiaries. A supplier must accept returns of substandard (less than full quality for the particular item) or unsuitable items (inappropriate for the beneficiary at the time it was fitted and rented or sold) from beneficiaries. A supplier must disclose these supplier standards to each beneficiary to whom it supplies a Medicare-covered item. A supplier must disclose to the government any person having ownership, financial, or control interest in the supplier. A supplier must not convey or reassign a supplier number; i.e., the supplier may not sell or allow another entity to use its Medicare billing number. A supplier must have a complaint resolution protocol established to address beneficiary complaints that relate to these standards. A record of these complaints must be maintained at the physical facility. Complaint records must include: the name, address, telephone number and health insurance claim number of the beneficiary, a summary of the complaint, and any action taken to resolve it. A supplier must agree to furnish CMS any information required by the Medicare statute and implementing regulations.   A supplier of DMEPOS and other items and services must be accredited by a CMS-approved accreditation organization in order to receive and retain a supplier billing number. The accreditation must indicate the specific products and services, for which the supplier is accredited in order for the supplier to receive payment for those specific products and services. A DMEPOS supplier must notify their accreditation organization when a new DMEPOS location is opened. The accreditation organization may accredit the new supplier location for three months after it is operational without requiring a new site visit. All DMEPOS supplier locations, whether owned or subcontracted, must meet the [employer] and be separately accredited in order to bill Medicare. An accredited supplier may be denied enrollment or their enrollment may be revoked, if CMS determines that they are not in compliance with the DMEPOS quality standards. A DMEPOS supplier must disclose upon enrollment all products and services, including the addition of new product lines for which they are seeking accreditation. If a new product line is added after enrollment, the DMEPOS supplier will be responsible for notifying the accrediting body of the new product so that the DMEPOS supplier can be re-surveyed and accredited for these new products. Must meet the surety bond requirements specified in 42 C. F.R. 424.57(c). Implementation date- May 4, 2009. A supplier must obtain oxygen from a state-licensed oxygen supplier. A supplier must maintain ordering and referring documentation consistent with provisions found in 42 C. F.R. 424.516(f). DMEPOS suppliers are prohibited from sharing a practice location with certain other Medicare providers and suppliers. DMEPOS suppliers must remain open to the public for a minimum of 30 hours per week with certain exceptions.

## 2022-06-23 NOTE — PROGRESS NOTES
Name: Scott Leary  YOB: 1965  Gender: female  MRN: [id number]      Procedure: Repair  Brevis tendon    Surgery Date: 5/18/2022    Subjective: Denies fevers chills or infection. Denies any signs of spreading erythema. She is doing well. She was walking on the cam boot. She still is having pain but it is better than prior. Exam: Wound healing appropriately. No signs of dehiscence or infection. No signs of erythema or drainage. Neurovascularly intact of the foot. With toes warm and well-perfused. Still some swelling and edema. She is able to actively do ankle circles. Imaging:   No imaging reviewed      Assessment/Plan:    Transition to ASO ankle brace. Begin physical therapy. Work on range of motion and then work on strengthening per peroneal tendon repair protocol. Follow-up in 6 weeks. At that point transition into compression ankle sleeve.

## 2022-06-23 NOTE — PROGRESS NOTES
The patient was prescribed a Wraptor brace for the patient's rightfoot. The patient wears a size 7.5 shoe and I fitted the patient with a S brace. I explained how to fit the brace properly by pulling the lace tabs across top of foot first then under arch and lastly pulling the strap up firmly and attaching to the lateral Velcro strip. Thus forming a figure 8 across the ankle joint. Once the figure 8 is completed they are to secure the top (short circumferential) straps to help avoid the straps from loosening with normal wear. The patient was able to demonstrate proper fitting in office to ensure compliance with device and acknowledged satisfaction with current fit. Patient read and signed documenting they understand and agree to [facility]'s current DME return policy.

## 2022-07-07 ENCOUNTER — PREP FOR PROCEDURE (OUTPATIENT)
Dept: NEUROSURGERY | Age: 57
End: 2022-07-07

## 2022-07-07 ENCOUNTER — TELEPHONE (OUTPATIENT)
Dept: NEUROSURGERY | Age: 57
End: 2022-07-07

## 2022-07-07 ENCOUNTER — OFFICE VISIT (OUTPATIENT)
Dept: NEUROSURGERY | Age: 57
End: 2022-07-07
Payer: COMMERCIAL

## 2022-07-07 VITALS
TEMPERATURE: 98.8 F | OXYGEN SATURATION: 98 % | BODY MASS INDEX: 35.44 KG/M2 | HEIGHT: 63 IN | DIASTOLIC BLOOD PRESSURE: 85 MMHG | HEART RATE: 74 BPM | WEIGHT: 200 LBS | SYSTOLIC BLOOD PRESSURE: 128 MMHG

## 2022-07-07 DIAGNOSIS — G89.4 CHRONIC PAIN SYNDROME: Primary | ICD-10-CM

## 2022-07-07 DIAGNOSIS — E66.9 OBESITY (BMI 30-39.9): ICD-10-CM

## 2022-07-07 DIAGNOSIS — G62.9 NEUROPATHY: ICD-10-CM

## 2022-07-07 PROCEDURE — 99214 OFFICE O/P EST MOD 30 MIN: CPT | Performed by: NEUROLOGICAL SURGERY

## 2022-07-07 RX ORDER — SODIUM CHLORIDE 0.9 % (FLUSH) 0.9 %
5-40 SYRINGE (ML) INJECTION PRN
Status: CANCELLED | OUTPATIENT
Start: 2022-07-07

## 2022-07-07 RX ORDER — SODIUM CHLORIDE 0.9 % (FLUSH) 0.9 %
5-40 SYRINGE (ML) INJECTION EVERY 12 HOURS SCHEDULED
Status: CANCELLED | OUTPATIENT
Start: 2022-07-07

## 2022-07-07 RX ORDER — SODIUM CHLORIDE 9 MG/ML
INJECTION, SOLUTION INTRAVENOUS PRN
Status: CANCELLED | OUTPATIENT
Start: 2022-07-07

## 2022-07-07 ASSESSMENT — PATIENT HEALTH QUESTIONNAIRE - PHQ9
SUM OF ALL RESPONSES TO PHQ QUESTIONS 1-9: 0
SUM OF ALL RESPONSES TO PHQ9 QUESTIONS 1 & 2: 0
2. FEELING DOWN, DEPRESSED OR HOPELESS: 0
SUM OF ALL RESPONSES TO PHQ QUESTIONS 1-9: 0
SUM OF ALL RESPONSES TO PHQ QUESTIONS 1-9: 0
1. LITTLE INTEREST OR PLEASURE IN DOING THINGS: 0
SUM OF ALL RESPONSES TO PHQ QUESTIONS 1-9: 0

## 2022-07-07 NOTE — PROGRESS NOTES
History of Present Illness    Patient Words: 60 y.o. This patient is a 60 y.o. female who presents today for evaluation of chronic pain syndrome and possible spinal cord stimulator insertion. The patient has had multiple spine surgeries and due to neuropathy and injury to the right foot she has been sedentary and has gained weight which is further complicating her pain syndrome treatment. She underwent spinal cord stimulator trial with [facility] which was highly successful in eliminating significant discomfort in the back and legs and she desires permanent implantation. She quit smoking cigarettes in 2016. She does have early signs of diabetes and peripheral neuropathy in the legs further complicating her discomfort.     Past Medical History:   Diagnosis Date    Calculus of kidney     Chronic pain     Elevated liver enzymes 05/17/2022 5/17/22 states at PCP now to discuss- AST 39, ALT 48    Former smoker, stopped smoking in distant past     quit 2016  0.5 ppd x 32 years    GERD (gastroesophageal reflux disease)     nexium daily, 2-3 pillows qhs as needed, hiatal hernia    History of kidney stones     last one 2007    Hypertension     no medication    Migraines     controlled with med    PONV (postoperative nausea and vomiting)     med thru i.v. helps    Psychiatric disorder     aniexty    Restless legs syndrome (RLS)         Allergies   Allergen Reactions    Doxycycline Itching    Penicillins Rash        Family History   Problem Relation Age of Onset    Hypertension Mother     Hypertension Father     Kidney Disease Father     Stroke Mother     Hypertension Sister     Heart Disease Father         Social History     Socioeconomic History    Marital status:      Spouse name: Not on file    Number of children: Not on file    Years of education: Not on file    Highest education level: Not on file   Occupational History    Not on file   Tobacco Use    Smoking status: Former Smoker     Packs/day: 0.50     Quit date: 3/17/2016     Years since quittin.3    Smokeless tobacco: Never Used   Substance and Sexual Activity    Alcohol use: No    Drug use: No    Sexual activity: Not on file   Other Topics Concern    Not on file   Social History Narrative    No history of physical or sexual abuse feels safe at home     Social Determinants of Health     Financial Resource Strain:     Difficulty of Paying Living Expenses: Not on file   Food Insecurity:     Worried About Running Out of Food in the Last Year: Not on file    Marciano of Food in the Last Year: Not on file   Transportation Needs:     Lack of Transportation (Medical): Not on file    Lack of Transportation (Non-Medical):  Not on file   Physical Activity:     Days of Exercise per Week: Not on file    Minutes of Exercise per Session: Not on file   Stress:     Feeling of Stress : Not on file   Social Connections:     Frequency of Communication with Friends and Family: Not on file    Frequency of Social Gatherings with Friends and Family: Not on file    Attends Gnosticist Services: Not on file    Active Member of [address] [place] [address] or Organizations: Not on file    Attends Club or Organization Meetings: Not on file    Marital Status: Not on file   Intimate Partner Violence:     Fear of Current or Ex-Partner: Not on file    Emotionally Abused: Not on file    Physically Abused: Not on file    Sexually Abused: Not on file   Housing Stability:     Unable to Pay for Housing in the Last Year: Not on file    Number of [place] in the Last Year: Not on file    Unstable Housing in the Last Year: Not on file       Current Outpatient Medications   Medication Sig Dispense Refill    amLODIPine (NORVASC) 10 MG tablet Take 10 mg by mouth daily      busPIRone (BUSPAR) 15 MG tablet Take 15 mg by mouth      clonazePAM (KLONOPIN) 0.5 MG tablet TAKE ONE TABLET BY MOUTH TWICE A DAY AS NEEDED FOR ANXIETY      clotrimazole-betamethasone  Obesity (BMI 30-39. 9)    Neuropathy        Review of Systems: A complete ROS was done and as stated in the HPI or otherwise negative. /85   Pulse 74   Temp 98.8 °F (37.1 °C) (Temporal)   Ht 5' 3\" (1.6 m)   Wt 200 lb (90.7 kg)   SpO2 98%   BMI 35.43 kg/m²        Physical Exam  The physical exam findings are as follows:Physical Exam:   General:  Alert, cooperative, no distress, appears stated age. Eyes:  Conjunctivae/corneas clear. PERRL, EOMs intact. Fundi benign   Ears:  Normal TMs and external ear canals both ears. Nose: Nares normal. Septum midline. Mucosa normal. No drainage or sinus tenderness. Mouth/Throat: Lips, mucosa, and tongue normal. Teeth and gums normal.   Neck: Supple, symmetrical, trachea midline, no adenopathy, thyroid: no enlargment/tenderness/nodules, no carotid bruit and no JVD. Back:   Symmetric, no curvature. ROM normal. No CVA tenderness. Lungs:   Clear to auscultation bilaterally. Heart:  Regular rate and rhythm, S1, S2 normal, no murmur, click, rub or gallop. Abdomen:   Soft, non-tender. Bowel sounds normal. No masses,  No organomegaly. Extremities: Extremities normal, atraumatic, no cyanosis or edema. Pulses: 2+ and symmetric all extremities. Skin: Skin color, texture, turgor normal. No rashes or lesions   Lymph nodes: Cervical, supraclavicular, and axillary nodes normal.   Appearance: The patient is well developed, well nourished, provides a coherent history and is in no acute distress. Cranial Nerves:   Intact visual fields. Fundi are benign. DAMIAN, EOM's full, no nystagmus, no ptosis. Facial sensation is normal. Corneal reflexes are intact. Facial movement is symmetric. Hearing is normal bilaterally. Palate is midline with normal sternocleidomastoid and trapezius muscles are normal. Tongue is midline. Motor:  5/5 strength in upper and lower proximal and distal muscles. Normal bulk and tone. No fasciculations.    Reflexes:   Deep tendon reflexes 2+/4 and symmetrical.  Upper extremities. Trace lower extremities   Sensory:   Normal to touch, pinprick and vibration. Gait:  Normal gait. Antalgic   Tremor:   No tremor noted. Cerebellar:  No cerebellar signs present. Assessment & Plan      ICD-10-CM    1. Chronic pain syndrome  G89.4    2. Obesity (BMI 30-39. 9)  E66.9    3. Neuropathy  G62.9       Spinal cord stimulator placement. Abbott labs. Consent Spinal Cord Stimulator: The relative risks of complication include but are not limited to infection, bleeding, spinal fluid leak, major vascular surgery, increased pain, weakness, numbness, paralysis, incontinence, impotence, heart attack, stroke and death were discussed with the patient and the family members present. They have been provided the opportunity to ask any questions regarding the surgical procedures. The patient and family members present expressed understanding and agree to proceed with surgery    The risks are higher due to diabetes and obesity. The patient understands and agrees to proceed. She will require 23-hour observation in order to receive medically necessary postoperative intravenous antibiotics to minimize the chance of infection.       Bhakti Damon MD

## 2022-07-08 ENCOUNTER — HOSPITAL ENCOUNTER (OUTPATIENT)
Dept: PREADMISSION TESTING | Age: 57
Discharge: HOME | End: 2022-07-11
Payer: COMMERCIAL

## 2022-07-08 VITALS
HEIGHT: 63 IN | OXYGEN SATURATION: 100 % | SYSTOLIC BLOOD PRESSURE: 145 MMHG | BODY MASS INDEX: 35.75 KG/M2 | TEMPERATURE: 97.3 F | RESPIRATION RATE: 18 BRPM | DIASTOLIC BLOOD PRESSURE: 76 MMHG | HEART RATE: 78 BPM | WEIGHT: 201.8 LBS

## 2022-07-08 LAB
ANION GAP SERPL CALC-SCNC: 4 MMOL/L (ref 7–16)
APPEARANCE UR: CLEAR
BACTERIA SPEC CULT: NORMAL
BASOPHILS # BLD: 0 K/UL (ref 0–0.2)
BASOPHILS NFR BLD: 1 % (ref 0–2)
BILIRUB UR QL: NEGATIVE
BUN SERPL-MCNC: 19 MG/DL (ref 6–23)
CALCIUM SERPL-MCNC: 9.6 MG/DL (ref 8.3–10.4)
CHLORIDE SERPL-SCNC: 103 MMOL/L (ref 98–107)
CO2 SERPL-SCNC: 30 MMOL/L (ref 21–32)
COLOR UR: ABNORMAL
CREAT SERPL-MCNC: 1.06 MG/DL (ref 0.6–1)
DIFFERENTIAL METHOD BLD: ABNORMAL
EOSINOPHIL # BLD: 0.2 K/UL (ref 0–0.8)
EOSINOPHIL NFR BLD: 2 % (ref 0.5–7.8)
ERYTHROCYTE [DISTWIDTH] IN BLOOD BY AUTOMATED COUNT: 14.6 % (ref 11.9–14.6)
EST. AVERAGE GLUCOSE BLD GHB EST-MCNC: 120 MG/DL
GLUCOSE BLD STRIP.AUTO-MCNC: 94 MG/DL (ref 65–100)
GLUCOSE SERPL-MCNC: 80 MG/DL (ref 65–100)
GLUCOSE UR STRIP.AUTO-MCNC: NEGATIVE MG/DL
HBA1C MFR BLD: 5.8 % (ref 4.8–5.6)
HCT VFR BLD AUTO: 35.2 % (ref 35.8–46.3)
HGB BLD-MCNC: 10.7 G/DL (ref 11.7–15.4)
HGB UR QL STRIP: NEGATIVE
IMM GRANULOCYTES # BLD AUTO: 0 K/UL (ref 0–0.5)
IMM GRANULOCYTES NFR BLD AUTO: 0 % (ref 0–5)
KETONES UR QL STRIP.AUTO: ABNORMAL MG/DL
LEUKOCYTE ESTERASE UR QL STRIP.AUTO: NEGATIVE
LYMPHOCYTES # BLD: 2.5 K/UL (ref 0.5–4.6)
LYMPHOCYTES NFR BLD: 35 % (ref 13–44)
MCH RBC QN AUTO: 24.3 PG (ref 26.1–32.9)
MCHC RBC AUTO-ENTMCNC: 30.4 G/DL (ref 31.4–35)
MCV RBC AUTO: 80 FL (ref 79.6–97.8)
MONOCYTES # BLD: 0.9 K/UL (ref 0.1–1.3)
MONOCYTES NFR BLD: 12 % (ref 4–12)
NEUTS SEG # BLD: 3.5 K/UL (ref 1.7–8.2)
NEUTS SEG NFR BLD: 50 % (ref 43–78)
NITRITE UR QL STRIP.AUTO: NEGATIVE
NRBC # BLD: 0 K/UL (ref 0–0.2)
PH UR STRIP: 5.5 [PH] (ref 5–9)
PLATELET # BLD AUTO: 292 K/UL (ref 150–450)
PMV BLD AUTO: 10 FL (ref 9.4–12.3)
POTASSIUM SERPL-SCNC: 3.5 MMOL/L (ref 3.5–5.1)
PROT UR STRIP-MCNC: NEGATIVE MG/DL
RBC # BLD AUTO: 4.4 M/UL (ref 4.05–5.2)
SERVICE CMNT-IMP: NORMAL
SERVICE CMNT-IMP: NORMAL
SODIUM SERPL-SCNC: 137 MMOL/L (ref 136–145)
SP GR UR REFRACTOMETRY: 1.02 (ref 1–1.02)
UROBILINOGEN UR QL STRIP.AUTO: 0.2 EU/DL (ref 0.2–1)
WBC # BLD AUTO: 7.1 K/UL (ref 4.3–11.1)

## 2022-07-08 PROCEDURE — 82962 GLUCOSE BLOOD TEST: CPT

## 2022-07-08 PROCEDURE — 81003 URINALYSIS AUTO W/O SCOPE: CPT

## 2022-07-08 PROCEDURE — 87641 MR-STAPH DNA AMP PROBE: CPT

## 2022-07-08 PROCEDURE — 83036 HEMOGLOBIN GLYCOSYLATED A1C: CPT

## 2022-07-08 PROCEDURE — 80048 BASIC METABOLIC PNL TOTAL CA: CPT

## 2022-07-08 PROCEDURE — 85025 COMPLETE CBC W/AUTO DIFF WBC: CPT

## 2022-07-08 RX ORDER — METFORMIN HYDROCHLORIDE 500 MG/1
500 TABLET, EXTENDED RELEASE ORAL DAILY
COMMUNITY
Start: 2022-07-07 | End: 2022-07-26

## 2022-07-08 RX ORDER — ONDANSETRON 4 MG/1
TABLET, ORALLY DISINTEGRATING ORAL AS NEEDED
COMMUNITY
Start: 2022-06-07 | End: 2022-07-26

## 2022-07-08 RX ORDER — IPRATROPIUM BROMIDE 42 UG/1
SPRAY, METERED NASAL AS NEEDED
COMMUNITY
Start: 2021-11-03

## 2022-07-08 RX ORDER — CHLORTHALIDONE 25 MG/1
TABLET ORAL DAILY
COMMUNITY
Start: 2022-05-03

## 2022-07-08 RX ORDER — NYSTATIN 100000 [USP'U]/G
POWDER TOPICAL AS NEEDED
COMMUNITY
Start: 2022-05-31

## 2022-07-08 RX ORDER — SUCRALFATE 1 G/1
TABLET ORAL 3 TIMES DAILY PRN
COMMUNITY

## 2022-07-08 RX ORDER — KETOCONAZOLE 20 MG/ML
SHAMPOO TOPICAL AS NEEDED
COMMUNITY
Start: 2022-03-07

## 2022-07-08 RX ORDER — AMLODIPINE BESYLATE 5 MG/1
10 TABLET ORAL DAILY
COMMUNITY
Start: 2022-05-31

## 2022-07-08 RX ORDER — VALSARTAN 80 MG/1
80 TABLET ORAL DAILY
COMMUNITY
Start: 2022-06-02

## 2022-07-08 ASSESSMENT — PAIN DESCRIPTION - LOCATION: LOCATION: BACK;LEG

## 2022-07-08 ASSESSMENT — PAIN DESCRIPTION - PAIN TYPE: TYPE: CHRONIC PAIN

## 2022-07-08 ASSESSMENT — PAIN DESCRIPTION - ORIENTATION: ORIENTATION: RIGHT;LEFT

## 2022-07-08 ASSESSMENT — PAIN DESCRIPTION - FREQUENCY: FREQUENCY: CONTINUOUS

## 2022-07-08 ASSESSMENT — PAIN - FUNCTIONAL ASSESSMENT: PAIN_FUNCTIONAL_ASSESSMENT: PREVENTS OR INTERFERES SOME ACTIVE ACTIVITIES AND ADLS

## 2022-07-08 ASSESSMENT — PAIN DESCRIPTION - DESCRIPTORS: DESCRIPTORS: ACHING;SHOOTING;THROBBING

## 2022-07-08 ASSESSMENT — PAIN SCALES - GENERAL: PAINLEVEL_OUTOF10: 10

## 2022-07-08 NOTE — PERIOP NOTE
Results for Yonis Galan (MRN [id number]) as of 7/8/2022 14:35   Ref.  Range 7/8/2022 10:27   Hemoglobin A1C Latest Ref Range: 4.8 - 5.6 % 5.8 (H)   eAG (mg/dL) Latest Units: mg/dL 120     Reviewed  Routed to surgeon

## 2022-07-08 NOTE — PERIOP NOTE
Patient verified name, , and surgery as listed in [facility]. Patient provided medical/health information and PTA medications to the best of their ability. Patient stated that she does not take tylenol due to elevated liver enzymes, patient instructed to call her PCP to verify that she should or should not take tylenol. TYPE  CASE: 1B  Orders per surgeon:  received  Labs per surgeon: CBC with diff, BMP, A1c, UA, MRSA/MSSA. Results: pending  Labs per anesthesia protocol: K+ included in BMP. SQBS s/h for DOS. Glucose: 94  EKG:  Not indicated- 1B     Nasal Swab collected per MD order. Patient provided with and instructed on education handouts including Guide to Surgery, blood transfusions, pain management, and hand hygiene for the family and community, and [facility] brochure. Road to Recovery Spine surgery patient guide given. Instructed on incentive spirometer. Patient given incentive spirometer and instructed to bring it the DOS. Hibiclens and instructions given per hospital policy. Medications discussed during patient appointment. Patient teach back successful and patient demonstrates knowledge of instruction.

## 2022-07-08 NOTE — PERIOP NOTE
PLEASE CONTINUE TAKING ALL PRESCRIPTION MEDICATIONS UP TO THE DAY OF SURGERY UNLESS OTHERWISE DIRECTED BELOW. DISCONTINUE all vitamins and supplements now for surgery. DISCONTINUE Non-Steriodal Anti-Inflammatory (NSAIDS) such as Advil and Aleve 5 days prior to surgery. Home Medications to take  the day of surgery    buspirone- buspar   Clonazepam if needed   Gabapentin    zofran if needed    Amlodipine    atrovent nasal spray if needed    nexium    Sucralfate if needed         Home Medications   to Hold   Vitamins, Supplements, and Herbals. Non-Steriodal Anti-Inflammatory (NSAIDS) such as Advil and Aleve. Hold voltaren gel 5 days prior to surgery     Hold the morning of surgery:  Metformin, valsartan, chlorthalidone, linzess     Comments       On the day before surgery please take Acetaminophen 1000mg in the morning and then again before bed. You may substitute for Tylenol 650 mg.        Bring incentive spirometer to the hospital       Please do not bring home medications with you on the day of surgery unless otherwise directed by your nurse. If you are instructed to bring home medications, please give them to your nurse as they will be administered by the nursing staff. If you have any questions, please call [address] [place] [address] [phone] or [address] [place] [address] [phone]. A copy of this note was provided to the patient for reference. How to Use Your Incentive Spirometer       About Your Incentive Spirometer  An incentive spirometer is a device that will expand your lungs by helping you to breathe more deeply and fully. The parts of your incentive spirometer are labeled in Figure 1. Using your incentive spirometer  When you're using your incentive spirometer, make sure to breathe through your mouth. If you breathe through your nose, the incentive spirometer won't work properly. You can hold your nose if you have trouble. DO NOT BLOW INTO THE DEVICE.  If you feel dizzy at any time, stop and rest. Try again at a later time.  Sit upright in a chair or in bed. Hold the incentive spirometer at eye level.  Put the mouthpiece in your mouth and close your lips tightly around it. Slowly breathe out (exhale) completely.  Breathe in (inhale) slowly through your mouth as deeply as you can. As you take the breath, you will see the piston rise inside the large column. While the piston rises, the indicator on the right should move upwards. It should stay in between the 2 arrows (see Figure 1).  Try to get the piston as high as you can, while keeping the indicator between the arrows. If the indicator doesn't stay between the arrows, you're breathing either too fast or too slow.  When you get it as high as you can, hold your breath for 10 seconds, or as long as possible. While you're holding your breath, the piston will slowly fall to the base of the spirometer.  Once the piston reaches the bottom of the spirometer, breathe out slowly through your mouth. Rest for a few seconds.  Repeat 10 times. Try to get the piston to the same level with each breath.  After each set of 10 breaths, try to cough as coughing will help loosen or clear any mucus in your lungs.  Put the marker at the level the piston reached on your incentive spirometer. This will be your goal next time. Repeat these steps every hour that you're awake.   Cover the mouthpiece of the incentive spirometer when you aren't using it

## 2022-07-08 NOTE — PERIOP NOTE
Results for Sarah Nieves (MRN [id number]) as of 7/8/2022 14:30   Ref.  Range 7/8/2022 10:00   Sodium Latest Ref Range: 136 - 145 mmol/L 137   Potassium Latest Ref Range: 3.5 - 5.1 mmol/L 3.5   Chloride Latest Ref Range: 98 - 107 mmol/L 103   CO2 Latest Ref Range: 21 - 32 mmol/L 30   BUN,BUNPL Latest Ref Range: 6 - 23 MG/DL 19   Creatinine Latest Ref Range: 0.6 - 1.0 MG/DL 1.06 (H)   Anion Gap Latest Ref Range: 7 - 16 mmol/L 4 (L)   GFR Non- Latest Ref Range: >60 ml/min/1.73m2 57 (L)   GFR  Latest Ref Range: >60 ml/min/1.73m2 >60   GLUCOSE, FASTING,GF Latest Ref Range: 65 - 100 mg/dL 80   CALCIUM, SERUM, [id number] Latest Ref Range: 8.3 - 10.4 MG/DL 9.6   WBC Latest Ref Range: 4.3 - 11.1 K/uL 7.1   RBC Latest Ref Range: 4.05 - 5.2 M/uL 4.40   Hemoglobin Quant Latest Ref Range: 11.7 - 15.4 g/dL 10.7 (L)   Hematocrit Latest Ref Range: 35.8 - 46.3 % 35.2 (L)   MCV Latest Ref Range: 79.6 - 97.8 FL 80.0   MCH Latest Ref Range: 26.1 - 32.9 PG 24.3 (L)   MCHC Latest Ref Range: 31.4 - 35.0 g/dL 30.4 (L)   MPV Latest Ref Range: 9.4 - 12.3 FL 10.0   RDW Latest Ref Range: 11.9 - 14.6 % 14.6   Platelet Count Latest Ref Range: 150 - 450 K/uL 292   Absolute Mono # Latest Ref Range: 0.1 - 1.3 K/UL 0.9   Eosinophils % Latest Ref Range: 0.5 - 7.8 % 2   Basophils Absolute Latest Ref Range: 0.0 - 0.2 K/UL 0.0   Differential Type Latest Units:   AUTOMATED   Seg Neutrophils Latest Ref Range: 43 - 78 % 50   Segs Absolute Latest Ref Range: 1.7 - 8.2 K/UL 3.5   Lymphocytes Latest Ref Range: 13 - 44 % 35   Absolute Lymph # Latest Ref Range: 0.5 - 4.6 K/UL 2.5   Monocytes Latest Ref Range: 4.0 - 12.0 % 12   Absolute Eos # Latest Ref Range: 0.0 - 0.8 K/UL 0.2   Basophils Latest Ref Range: 0.0 - 2.0 % 1   Immature Granulocytes Latest Ref Range: 0.0 - 5.0 % 0   Nucleated Red Blood Cells Latest Ref Range: 0.0 - 0.2 K/uL 0.00   Absolute Immature Granulocyte Latest Ref Range: 0.0 - 0.5 K/UL 0.0   Color, UA Latest Units: YELLOW/STRAW   Glucose, UA Latest Units: mg/dL Negative   Bilirubin, Urine Latest Ref Range: NEG   Negative   Ketones, Urine Latest Ref Range: NEG mg/dL TRACE (A)   Specific [place], UA Latest Ref Range: 1.001 - 1.023   1.022   Blood, Urine Latest Ref Range: NEG   Negative   Protein, UA Latest Ref Range: NEG mg/dL Negative   Urobilinogen, Urine Latest Ref Range: 0.2 - 1.0 EU/dL 0.2   Nitrite, Urine Latest Ref Range: NEG   Negative   Leukocyte Esterase, Urine Latest Ref Range: NEG   Negative   Appearance Latest Units:   CLEAR   pH, Urine Latest Ref Range: 5.0 - 9.0   5.5   MSSA/MRSA SCREEN BY PCR Unknown Rpt          MRSA in process- chart flagged for charge RN  Reviewed, routed to surgeon

## 2022-07-14 ENCOUNTER — ANESTHESIA EVENT (OUTPATIENT)
Dept: SURGERY | Age: 57
End: 2022-07-14
Payer: COMMERCIAL

## 2022-07-14 NOTE — PERIOP NOTE
Directly informed patient and or family member of pre op arrival time 56 on 7/15. All questions answered. Pre op instructions reviewed. Left contact information for any additional questions or needs.

## 2022-07-15 ENCOUNTER — APPOINTMENT (OUTPATIENT)
Dept: GENERAL RADIOLOGY | Age: 57
End: 2022-07-15
Attending: NEUROLOGICAL SURGERY
Payer: COMMERCIAL

## 2022-07-15 ENCOUNTER — HOSPITAL ENCOUNTER (OUTPATIENT)
Age: 57
Discharge: HOME | End: 2022-07-16
Attending: NEUROLOGICAL SURGERY | Admitting: NEUROLOGICAL SURGERY
Payer: COMMERCIAL

## 2022-07-15 ENCOUNTER — ANESTHESIA (OUTPATIENT)
Dept: SURGERY | Age: 57
End: 2022-07-15
Payer: COMMERCIAL

## 2022-07-15 DIAGNOSIS — G89.4 CHRONIC PAIN SYNDROME: Primary | ICD-10-CM

## 2022-07-15 LAB
CREAT SERPL-MCNC: 1.2 MG/DL (ref 0.6–1)
GLUCOSE BLD STRIP.AUTO-MCNC: 95 MG/DL (ref 65–100)
POTASSIUM BLD-SCNC: 3.3 MMOL/L (ref 3.5–5.1)
SERVICE CMNT-IMP: NORMAL

## 2022-07-15 PROCEDURE — 7100000001 HC PACU RECOVERY - ADDTL 15 MIN: Performed by: NEUROLOGICAL SURGERY

## 2022-07-15 PROCEDURE — 6360000002 HC RX W HCPCS: Performed by: ANESTHESIOLOGY

## 2022-07-15 PROCEDURE — 2709999900 HC NON-CHARGEABLE SUPPLY: Performed by: NEUROLOGICAL SURGERY

## 2022-07-15 PROCEDURE — C1767 GENERATOR, NEURO NON-RECHARG: HCPCS | Performed by: NEUROLOGICAL SURGERY

## 2022-07-15 PROCEDURE — 3700000001 HC ADD 15 MINUTES (ANESTHESIA): Performed by: NEUROLOGICAL SURGERY

## 2022-07-15 PROCEDURE — 82565 ASSAY OF CREATININE: CPT

## 2022-07-15 PROCEDURE — 6360000002 HC RX W HCPCS: Performed by: NEUROLOGICAL SURGERY

## 2022-07-15 PROCEDURE — 2580000003 HC RX 258: Performed by: NEUROLOGICAL SURGERY

## 2022-07-15 PROCEDURE — 2720000010 HC SURG SUPPLY STERILE: Performed by: NEUROLOGICAL SURGERY

## 2022-07-15 PROCEDURE — 6370000000 HC RX 637 (ALT 250 FOR IP): Performed by: ANESTHESIOLOGY

## 2022-07-15 PROCEDURE — 2500000003 HC RX 250 WO HCPCS: Performed by: NEUROLOGICAL SURGERY

## 2022-07-15 PROCEDURE — 84132 ASSAY OF SERUM POTASSIUM: CPT

## 2022-07-15 PROCEDURE — 6370000000 HC RX 637 (ALT 250 FOR IP): Performed by: NEUROLOGICAL SURGERY

## 2022-07-15 PROCEDURE — 63685 INS/RPLC SPI NPG/RCVR POCKET: CPT | Performed by: NEUROLOGICAL SURGERY

## 2022-07-15 PROCEDURE — 3700000000 HC ANESTHESIA ATTENDED CARE: Performed by: NEUROLOGICAL SURGERY

## 2022-07-15 PROCEDURE — 82962 GLUCOSE BLOOD TEST: CPT

## 2022-07-15 PROCEDURE — 6360000002 HC RX W HCPCS: Performed by: NURSE ANESTHETIST, CERTIFIED REGISTERED

## 2022-07-15 PROCEDURE — 3600000013 HC SURGERY LEVEL 3 ADDTL 15MIN: Performed by: NEUROLOGICAL SURGERY

## 2022-07-15 PROCEDURE — 36415 COLL VENOUS BLD VENIPUNCTURE: CPT

## 2022-07-15 PROCEDURE — 72020 X-RAY EXAM OF SPINE 1 VIEW: CPT

## 2022-07-15 PROCEDURE — 7100000000 HC PACU RECOVERY - FIRST 15 MIN: Performed by: NEUROLOGICAL SURGERY

## 2022-07-15 PROCEDURE — 2580000003 HC RX 258: Performed by: ANESTHESIOLOGY

## 2022-07-15 PROCEDURE — C1778 LEAD, NEUROSTIMULATOR: HCPCS | Performed by: NEUROLOGICAL SURGERY

## 2022-07-15 PROCEDURE — 63655 IMPLANT NEUROELECTRODES: CPT | Performed by: NEUROLOGICAL SURGERY

## 2022-07-15 PROCEDURE — A4217 STERILE WATER/SALINE, 500 ML: HCPCS | Performed by: NEUROLOGICAL SURGERY

## 2022-07-15 PROCEDURE — 2500000003 HC RX 250 WO HCPCS: Performed by: NURSE ANESTHETIST, CERTIFIED REGISTERED

## 2022-07-15 PROCEDURE — 3600000003 HC SURGERY LEVEL 3 BASE: Performed by: NEUROLOGICAL SURGERY

## 2022-07-15 DEVICE — IMPLANTABLE PULSE GENERATOR
Type: IMPLANTABLE DEVICE | Site: HIP | Status: NON-FUNCTIONAL
Brand: PROCLAIM™
Removed: 2022-07-27

## 2022-07-15 DEVICE — 3MM LEAD, 60 CM
Type: IMPLANTABLE DEVICE | Site: SPINE THORACIC | Status: NON-FUNCTIONAL
Brand: PENTA™
Removed: 2022-07-27

## 2022-07-15 RX ORDER — DEXAMETHASONE SODIUM PHOSPHATE 10 MG/ML
INJECTION INTRAMUSCULAR; INTRAVENOUS PRN
Status: DISCONTINUED | OUTPATIENT
Start: 2022-07-15 | End: 2022-07-15 | Stop reason: SDUPTHER

## 2022-07-15 RX ORDER — METFORMIN HYDROCHLORIDE 500 MG/1
500 TABLET, EXTENDED RELEASE ORAL DAILY
Status: DISCONTINUED | OUTPATIENT
Start: 2022-07-15 | End: 2022-07-16 | Stop reason: HOSPADM

## 2022-07-15 RX ORDER — SCOLOPAMINE TRANSDERMAL SYSTEM 1 MG/1
1 PATCH, EXTENDED RELEASE TRANSDERMAL
Status: DISCONTINUED | OUTPATIENT
Start: 2022-07-15 | End: 2022-07-15

## 2022-07-15 RX ORDER — ONDANSETRON 2 MG/ML
4 INJECTION INTRAMUSCULAR; INTRAVENOUS
Status: COMPLETED | OUTPATIENT
Start: 2022-07-15 | End: 2022-07-15

## 2022-07-15 RX ORDER — SODIUM CHLORIDE 0.9 % (FLUSH) 0.9 %
5-40 SYRINGE (ML) INJECTION PRN
Status: DISCONTINUED | OUTPATIENT
Start: 2022-07-15 | End: 2022-07-15 | Stop reason: HOSPADM

## 2022-07-15 RX ORDER — HYDROMORPHONE HYDROCHLORIDE 1 MG/ML
0.25 INJECTION, SOLUTION INTRAMUSCULAR; INTRAVENOUS; SUBCUTANEOUS
Status: DISCONTINUED | OUTPATIENT
Start: 2022-07-15 | End: 2022-07-16 | Stop reason: HOSPADM

## 2022-07-15 RX ORDER — VALACYCLOVIR HYDROCHLORIDE 500 MG/1
1000 TABLET, FILM COATED ORAL PRN
Status: DISCONTINUED | OUTPATIENT
Start: 2022-07-15 | End: 2022-07-15

## 2022-07-15 RX ORDER — OXYCODONE HYDROCHLORIDE 5 MG/1
10 TABLET ORAL EVERY 4 HOURS PRN
Status: DISCONTINUED | OUTPATIENT
Start: 2022-07-15 | End: 2022-07-16 | Stop reason: HOSPADM

## 2022-07-15 RX ORDER — OXYCODONE HYDROCHLORIDE 5 MG/1
10 TABLET ORAL PRN
Status: COMPLETED | OUTPATIENT
Start: 2022-07-15 | End: 2022-07-15

## 2022-07-15 RX ORDER — ACETAMINOPHEN 500 MG
1000 TABLET ORAL ONCE
Status: DISCONTINUED | OUTPATIENT
Start: 2022-07-15 | End: 2022-07-15 | Stop reason: HOSPADM

## 2022-07-15 RX ORDER — SODIUM CHLORIDE, SODIUM LACTATE, POTASSIUM CHLORIDE, CALCIUM CHLORIDE 600; 310; 30; 20 MG/100ML; MG/100ML; MG/100ML; MG/100ML
INJECTION, SOLUTION INTRAVENOUS CONTINUOUS
Status: DISCONTINUED | OUTPATIENT
Start: 2022-07-15 | End: 2022-07-15 | Stop reason: HOSPADM

## 2022-07-15 RX ORDER — ONDANSETRON 4 MG/1
4 TABLET, ORALLY DISINTEGRATING ORAL EVERY 8 HOURS PRN
Status: DISCONTINUED | OUTPATIENT
Start: 2022-07-15 | End: 2022-07-16 | Stop reason: HOSPADM

## 2022-07-15 RX ORDER — FENTANYL CITRATE 50 UG/ML
100 INJECTION, SOLUTION INTRAMUSCULAR; INTRAVENOUS
Status: DISCONTINUED | OUTPATIENT
Start: 2022-07-15 | End: 2022-07-15 | Stop reason: HOSPADM

## 2022-07-15 RX ORDER — OXYCODONE HYDROCHLORIDE 5 MG/1
5 TABLET ORAL PRN
Status: COMPLETED | OUTPATIENT
Start: 2022-07-15 | End: 2022-07-15

## 2022-07-15 RX ORDER — SCOLOPAMINE TRANSDERMAL SYSTEM 1 MG/1
1 PATCH, EXTENDED RELEASE TRANSDERMAL ONCE
Status: DISCONTINUED | OUTPATIENT
Start: 2022-07-15 | End: 2022-07-15 | Stop reason: HOSPADM

## 2022-07-15 RX ORDER — AMLODIPINE BESYLATE 10 MG/1
10 TABLET ORAL DAILY
Status: DISCONTINUED | OUTPATIENT
Start: 2022-07-16 | End: 2022-07-16 | Stop reason: HOSPADM

## 2022-07-15 RX ORDER — OXYCODONE HYDROCHLORIDE 5 MG/1
5 TABLET ORAL EVERY 4 HOURS PRN
Status: DISCONTINUED | OUTPATIENT
Start: 2022-07-15 | End: 2022-07-16 | Stop reason: HOSPADM

## 2022-07-15 RX ORDER — VANCOMYCIN HYDROCHLORIDE 1 G/20ML
INJECTION, POWDER, LYOPHILIZED, FOR SOLUTION INTRAVENOUS PRN
Status: DISCONTINUED | OUTPATIENT
Start: 2022-07-15 | End: 2022-07-15 | Stop reason: ALTCHOICE

## 2022-07-15 RX ORDER — OXYCODONE AND ACETAMINOPHEN 7.5; 325 MG/1; MG/1
1 TABLET ORAL EVERY 6 HOURS PRN
Qty: 90 TABLET | Refills: 0 | Status: SHIPPED | OUTPATIENT
Start: 2022-07-15 | End: 2022-07-26

## 2022-07-15 RX ORDER — SODIUM CHLORIDE 9 MG/ML
INJECTION, SOLUTION INTRAVENOUS PRN
Status: DISCONTINUED | OUTPATIENT
Start: 2022-07-15 | End: 2022-07-16 | Stop reason: HOSPADM

## 2022-07-15 RX ORDER — EPHEDRINE SULFATE/0.9% NACL/PF 50 MG/5 ML
SYRINGE (ML) INTRAVENOUS PRN
Status: DISCONTINUED | OUTPATIENT
Start: 2022-07-15 | End: 2022-07-15 | Stop reason: SDUPTHER

## 2022-07-15 RX ORDER — POTASSIUM CHLORIDE 7.45 MG/ML
10 INJECTION INTRAVENOUS ONCE
Status: COMPLETED | OUTPATIENT
Start: 2022-07-15 | End: 2022-07-16

## 2022-07-15 RX ORDER — ONDANSETRON 2 MG/ML
4 INJECTION INTRAMUSCULAR; INTRAVENOUS EVERY 6 HOURS PRN
Status: DISCONTINUED | OUTPATIENT
Start: 2022-07-15 | End: 2022-07-16 | Stop reason: HOSPADM

## 2022-07-15 RX ORDER — NALOXONE HYDROCHLORIDE 0.4 MG/ML
0.2 INJECTION, SOLUTION INTRAMUSCULAR; INTRAVENOUS; SUBCUTANEOUS PRN
Status: DISCONTINUED | OUTPATIENT
Start: 2022-07-15 | End: 2022-07-16 | Stop reason: HOSPADM

## 2022-07-15 RX ORDER — SODIUM CHLORIDE 0.9 % (FLUSH) 0.9 %
5-40 SYRINGE (ML) INJECTION EVERY 12 HOURS SCHEDULED
Status: DISCONTINUED | OUTPATIENT
Start: 2022-07-15 | End: 2022-07-15 | Stop reason: HOSPADM

## 2022-07-15 RX ORDER — SODIUM CHLORIDE 0.9 % (FLUSH) 0.9 %
5-40 SYRINGE (ML) INJECTION EVERY 12 HOURS SCHEDULED
Status: DISCONTINUED | OUTPATIENT
Start: 2022-07-15 | End: 2022-07-16 | Stop reason: HOSPADM

## 2022-07-15 RX ORDER — ROCURONIUM BROMIDE 10 MG/ML
INJECTION, SOLUTION INTRAVENOUS PRN
Status: DISCONTINUED | OUTPATIENT
Start: 2022-07-15 | End: 2022-07-15 | Stop reason: SDUPTHER

## 2022-07-15 RX ORDER — CLOTRIMAZOLE AND BETAMETHASONE DIPROPIONATE 10; .64 MG/G; MG/G
CREAM TOPICAL 2 TIMES DAILY PRN
Status: DISCONTINUED | OUTPATIENT
Start: 2022-07-15 | End: 2022-07-16 | Stop reason: HOSPADM

## 2022-07-15 RX ORDER — SODIUM CHLORIDE 0.9 % (FLUSH) 0.9 %
5-40 SYRINGE (ML) INJECTION PRN
Status: DISCONTINUED | OUTPATIENT
Start: 2022-07-15 | End: 2022-07-16 | Stop reason: HOSPADM

## 2022-07-15 RX ORDER — DIPHENHYDRAMINE HYDROCHLORIDE 50 MG/ML
12.5 INJECTION INTRAMUSCULAR; INTRAVENOUS
Status: DISCONTINUED | OUTPATIENT
Start: 2022-07-15 | End: 2022-07-15 | Stop reason: HOSPADM

## 2022-07-15 RX ORDER — GLYCOPYRROLATE 0.2 MG/ML
INJECTION INTRAMUSCULAR; INTRAVENOUS PRN
Status: DISCONTINUED | OUTPATIENT
Start: 2022-07-15 | End: 2022-07-15 | Stop reason: SDUPTHER

## 2022-07-15 RX ORDER — SODIUM CHLORIDE 9 MG/ML
INJECTION, SOLUTION INTRAVENOUS CONTINUOUS
Status: DISCONTINUED | OUTPATIENT
Start: 2022-07-15 | End: 2022-07-16 | Stop reason: HOSPADM

## 2022-07-15 RX ORDER — SODIUM CHLORIDE 9 MG/ML
INJECTION, SOLUTION INTRAVENOUS PRN
Status: DISCONTINUED | OUTPATIENT
Start: 2022-07-15 | End: 2022-07-15 | Stop reason: HOSPADM

## 2022-07-15 RX ORDER — FENTANYL CITRATE 50 UG/ML
25 INJECTION, SOLUTION INTRAMUSCULAR; INTRAVENOUS EVERY 5 MIN PRN
Status: DISCONTINUED | OUTPATIENT
Start: 2022-07-15 | End: 2022-07-15 | Stop reason: HOSPADM

## 2022-07-15 RX ORDER — SODIUM CHLORIDE, SODIUM LACTATE, POTASSIUM CHLORIDE, CALCIUM CHLORIDE 600; 310; 30; 20 MG/100ML; MG/100ML; MG/100ML; MG/100ML
INJECTION, SOLUTION INTRAVENOUS CONTINUOUS
Status: DISCONTINUED | OUTPATIENT
Start: 2022-07-15 | End: 2022-07-16 | Stop reason: HOSPADM

## 2022-07-15 RX ORDER — PROPOFOL 10 MG/ML
INJECTION, EMULSION INTRAVENOUS PRN
Status: DISCONTINUED | OUTPATIENT
Start: 2022-07-15 | End: 2022-07-15 | Stop reason: SDUPTHER

## 2022-07-15 RX ORDER — METOCLOPRAMIDE HYDROCHLORIDE 5 MG/ML
10 INJECTION INTRAMUSCULAR; INTRAVENOUS
Status: COMPLETED | OUTPATIENT
Start: 2022-07-15 | End: 2022-07-15

## 2022-07-15 RX ORDER — HYDROMORPHONE HYDROCHLORIDE 1 MG/ML
0.5 INJECTION, SOLUTION INTRAMUSCULAR; INTRAVENOUS; SUBCUTANEOUS
Status: DISCONTINUED | OUTPATIENT
Start: 2022-07-15 | End: 2022-07-16 | Stop reason: HOSPADM

## 2022-07-15 RX ORDER — LIDOCAINE HYDROCHLORIDE 20 MG/ML
INJECTION, SOLUTION EPIDURAL; INFILTRATION; INTRACAUDAL; PERINEURAL PRN
Status: DISCONTINUED | OUTPATIENT
Start: 2022-07-15 | End: 2022-07-15 | Stop reason: SDUPTHER

## 2022-07-15 RX ORDER — NEOSTIGMINE METHYLSULFATE 1 MG/ML
INJECTION, SOLUTION INTRAVENOUS PRN
Status: DISCONTINUED | OUTPATIENT
Start: 2022-07-15 | End: 2022-07-15 | Stop reason: SDUPTHER

## 2022-07-15 RX ORDER — HYDROMORPHONE HYDROCHLORIDE 2 MG/ML
0.5 INJECTION, SOLUTION INTRAMUSCULAR; INTRAVENOUS; SUBCUTANEOUS EVERY 10 MIN PRN
Status: DISCONTINUED | OUTPATIENT
Start: 2022-07-15 | End: 2022-07-15 | Stop reason: HOSPADM

## 2022-07-15 RX ORDER — ACETAMINOPHEN 325 MG/1
650 TABLET ORAL EVERY 4 HOURS PRN
Status: DISCONTINUED | OUTPATIENT
Start: 2022-07-15 | End: 2022-07-16 | Stop reason: HOSPADM

## 2022-07-15 RX ORDER — FENTANYL CITRATE 50 UG/ML
INJECTION, SOLUTION INTRAMUSCULAR; INTRAVENOUS PRN
Status: DISCONTINUED | OUTPATIENT
Start: 2022-07-15 | End: 2022-07-15 | Stop reason: SDUPTHER

## 2022-07-15 RX ORDER — VANCOMYCIN HYDROCHLORIDE 1 G/20ML
INJECTION, POWDER, LYOPHILIZED, FOR SOLUTION INTRAVENOUS PRN
Status: DISCONTINUED | OUTPATIENT
Start: 2022-07-15 | End: 2022-07-15 | Stop reason: SDUPTHER

## 2022-07-15 RX ORDER — PANTOPRAZOLE SODIUM 40 MG/1
40 TABLET, DELAYED RELEASE ORAL DAILY
Status: DISCONTINUED | OUTPATIENT
Start: 2022-07-15 | End: 2022-07-16 | Stop reason: HOSPADM

## 2022-07-15 RX ORDER — MIDAZOLAM HYDROCHLORIDE 2 MG/2ML
2 INJECTION, SOLUTION INTRAMUSCULAR; INTRAVENOUS
Status: COMPLETED | OUTPATIENT
Start: 2022-07-15 | End: 2022-07-15

## 2022-07-15 RX ORDER — CLONAZEPAM 1 MG/1
0.5 TABLET ORAL 2 TIMES DAILY PRN
Status: DISCONTINUED | OUTPATIENT
Start: 2022-07-16 | End: 2022-07-16 | Stop reason: HOSPADM

## 2022-07-15 RX ORDER — CYCLOBENZAPRINE HCL 5 MG
10 TABLET ORAL 3 TIMES DAILY PRN
Status: DISCONTINUED | OUTPATIENT
Start: 2022-07-15 | End: 2022-07-16 | Stop reason: HOSPADM

## 2022-07-15 RX ORDER — ONDANSETRON 2 MG/ML
INJECTION INTRAMUSCULAR; INTRAVENOUS PRN
Status: DISCONTINUED | OUTPATIENT
Start: 2022-07-15 | End: 2022-07-15 | Stop reason: SDUPTHER

## 2022-07-15 RX ADMIN — SODIUM CHLORIDE: 9 INJECTION, SOLUTION INTRAVENOUS at 17:13

## 2022-07-15 RX ADMIN — OXYCODONE 10 MG: 5 TABLET ORAL at 16:05

## 2022-07-15 RX ADMIN — PROPOFOL 150 MG: 10 INJECTION, EMULSION INTRAVENOUS at 12:47

## 2022-07-15 RX ADMIN — DEXAMETHASONE SODIUM PHOSPHATE 10 MG: 10 INJECTION INTRAMUSCULAR; INTRAVENOUS at 13:01

## 2022-07-15 RX ADMIN — FENTANYL CITRATE 25 MCG: 50 INJECTION, SOLUTION INTRAMUSCULAR; INTRAVENOUS at 14:11

## 2022-07-15 RX ADMIN — SODIUM CHLORIDE, PRESERVATIVE FREE 10 ML: 5 INJECTION INTRAVENOUS at 20:39

## 2022-07-15 RX ADMIN — VANCOMYCIN HYDROCHLORIDE 1500 MG: 1 INJECTION, POWDER, LYOPHILIZED, FOR SOLUTION INTRAVENOUS at 13:09

## 2022-07-15 RX ADMIN — FENTANYL CITRATE 25 MCG: 50 INJECTION, SOLUTION INTRAMUSCULAR; INTRAVENOUS at 14:13

## 2022-07-15 RX ADMIN — CYCLOBENZAPRINE HYDROCHLORIDE 10 MG: 5 TABLET, FILM COATED ORAL at 18:32

## 2022-07-15 RX ADMIN — ONDANSETRON 4 MG: 2 INJECTION INTRAMUSCULAR; INTRAVENOUS at 15:22

## 2022-07-15 RX ADMIN — SODIUM CHLORIDE, SODIUM LACTATE, POTASSIUM CHLORIDE, AND CALCIUM CHLORIDE: 600; 310; 30; 20 INJECTION, SOLUTION INTRAVENOUS at 12:43

## 2022-07-15 RX ADMIN — ROCURONIUM BROMIDE 40 MG: 10 INJECTION INTRAVENOUS at 12:47

## 2022-07-15 RX ADMIN — LIDOCAINE HYDROCHLORIDE 100 MG: 20 INJECTION, SOLUTION EPIDURAL; INFILTRATION; INTRACAUDAL; PERINEURAL at 12:47

## 2022-07-15 RX ADMIN — METOCLOPRAMIDE 10 MG: 5 INJECTION, SOLUTION INTRAMUSCULAR; INTRAVENOUS at 15:41

## 2022-07-15 RX ADMIN — Medication 3 MG: at 14:07

## 2022-07-15 RX ADMIN — SODIUM CHLORIDE, SODIUM LACTATE, POTASSIUM CHLORIDE, AND CALCIUM CHLORIDE: 600; 310; 30; 20 INJECTION, SOLUTION INTRAVENOUS at 13:30

## 2022-07-15 RX ADMIN — GLYCOPYRROLATE 0.4 MG: 0.2 INJECTION, SOLUTION INTRAMUSCULAR; INTRAVENOUS at 14:07

## 2022-07-15 RX ADMIN — POTASSIUM CHLORIDE 10 MEQ: 7.46 INJECTION, SOLUTION INTRAVENOUS at 12:05

## 2022-07-15 RX ADMIN — MIDAZOLAM 2 MG: 1 INJECTION INTRAMUSCULAR; INTRAVENOUS at 12:01

## 2022-07-15 RX ADMIN — PANTOPRAZOLE SODIUM 40 MG: 40 TABLET, DELAYED RELEASE ORAL at 17:12

## 2022-07-15 RX ADMIN — Medication 3 AMPULE: at 11:21

## 2022-07-15 RX ADMIN — PROPOFOL 50 MG: 10 INJECTION, EMULSION INTRAVENOUS at 14:10

## 2022-07-15 RX ADMIN — FENTANYL CITRATE 25 MCG: 50 INJECTION, SOLUTION INTRAMUSCULAR; INTRAVENOUS at 14:50

## 2022-07-15 RX ADMIN — Medication 2000 MG: at 12:56

## 2022-07-15 RX ADMIN — ONDANSETRON 4 MG: 2 INJECTION INTRAMUSCULAR; INTRAVENOUS at 13:01

## 2022-07-15 RX ADMIN — Medication 10 MG: at 14:29

## 2022-07-15 RX ADMIN — FENTANYL CITRATE 25 MCG: 50 INJECTION, SOLUTION INTRAMUSCULAR; INTRAVENOUS at 14:53

## 2022-07-15 RX ADMIN — BUSPIRONE HYDROCHLORIDE 15 MG: 5 TABLET ORAL at 20:39

## 2022-07-15 RX ADMIN — FENTANYL CITRATE 50 MCG: 50 INJECTION, SOLUTION INTRAMUSCULAR; INTRAVENOUS at 13:49

## 2022-07-15 RX ADMIN — FENTANYL CITRATE 100 MCG: 50 INJECTION, SOLUTION INTRAMUSCULAR; INTRAVENOUS at 12:47

## 2022-07-15 RX ADMIN — FENTANYL CITRATE 50 MCG: 50 INJECTION, SOLUTION INTRAMUSCULAR; INTRAVENOUS at 13:21

## 2022-07-15 ASSESSMENT — PAIN SCALES - GENERAL: PAINLEVEL_OUTOF10: 0

## 2022-07-15 ASSESSMENT — PAIN - FUNCTIONAL ASSESSMENT: PAIN_FUNCTIONAL_ASSESSMENT: 0-10

## 2022-07-15 NOTE — ANESTHESIA PRE PROCEDURE
Department of Anesthesiology  Preprocedure Note       Name:  Charo José   Age:  60 y.o.  :  1965                                          MRN:  [id number]         Date:  7/15/2022      Surgeon: Jessie Mohs):  Sammi Barrios MD    Procedure: Procedure(s):  STIMULATOR INSERTION-Spinal Cord Stimulator Placement    Medications prior to admission:   Prior to Admission medications    Medication Sig Start Date End Date Taking?  Authorizing Provider   amLODIPine (NORVASC) 5 MG tablet 10 mg daily 22   Historical Provider, MD   chlorthalidone (HYGROTON) 25 MG tablet daily 5/3/22   Historical Provider, MD   diclofenac sodium (VOLTAREN) 1 % GEL Apply 1-2 g topically as needed 5/3/22   Historical Provider, MD   ipratropium (ATROVENT) 0.06 % nasal spray as needed 11/3/21   Historical Provider, MD   ketoconazole (NIZORAL) 2 % shampoo as needed 3/7/22   Historical Provider, MD   linaCLOtide Linda Mutton) 72 MCG CAPS capsule daily    Historical Provider, MD   metFORMIN (GLUCOPHAGE-XR) 500 MG extended release tablet Take 500 mg by mouth daily 22   Historical Provider, MD   nystatin (MYCOSTATIN) 612625 UNIT/GM powder as needed 22   Historical Provider, MD   ondansetron (ZOFRAN-ODT) 4 MG disintegrating tablet as needed 22   Historical Provider, MD   sucralfate (CARAFATE) 1 GM tablet 3 times daily as needed    Historical Provider, MD   valsartan (DIOVAN) 80 MG tablet daily 22   Historical Provider, MD   busPIRone (BUSPAR) 15 MG tablet Take 15 mg by mouth in the morning and at bedtime  18   Ar Automatic Reconciliation   clonazePAM (KLONOPIN) 0.5 MG tablet TAKE ONE TABLET BY MOUTH TWICE A DAY AS NEEDED FOR ANXIETY 1/10/22   Ar Automatic Reconciliation   clotrimazole-betamethasone (LOTRISONE) 1-0.05 % cream Apply topically 2 times daily as needed  19   Ar Automatic Reconciliation   cyclobenzaprine (FLEXERIL) 10 MG tablet Take 10 mg by mouth 3 times daily as needed  10/19/21   Ar Automatic Reconciliation dicyclomine (BENTYL) 10 MG capsule TAKE ONE CAPSULE BY MOUTH FOUR TIMES A DAY AS NEEDED 12/7/21   Ar Automatic Reconciliation   esomeprazole (NEXIUM) 40 MG delayed release capsule Take 40 mg by mouth daily    Ar Automatic Reconciliation   gabapentin (NEURONTIN) 400 MG capsule Take 400 mg by mouth 3 times daily as needed. Ar Automatic Reconciliation   Probiotic Product (ACIDOPHILUS PROBIOTIC) CAPS capsule Take by mouth daily    Ar Automatic Reconciliation   rizatriptan (MAXALT-MLT) 10 MG disintegrating tablet Place 10 mg under the tongue as needed  7/2/20   Ar Automatic Reconciliation   valACYclovir (VALTREX) 1 g tablet Take 1,000 mg by mouth as needed    Ar Automatic Reconciliation       Current medications:    Current Facility-Administered Medications   Medication Dose Route Frequency Provider Last Rate Last Admin    acetaminophen (TYLENOL) tablet 1,000 mg  1,000 mg Oral Once Joella Romberg, MD        fentaNYL (SUBLIMAZE) injection 100 mcg  100 mcg IntraVENous Once PRN Joella Romberg, MD        lactated ringers infusion   IntraVENous Continuous Joella Romberg, MD        sodium chloride flush 0.9 % injection 5-40 mL  5-40 mL IntraVENous 2 times per day Joella Romberg, MD        sodium chloride flush 0.9 % injection 5-40 mL  5-40 mL IntraVENous PRN Joella Romberg, MD        sodium chloride flush 0.9 % injection 5-40 mL  5-40 mL IntraVENous 2 times per day Irish Ellis MD        sodium chloride flush 0.9 % injection 5-40 mL  5-40 mL IntraVENous PRN Irish Ellis MD        0.9 % sodium chloride infusion   IntraVENous PRN Irish Ellis MD        ceFAZolin (ANCEF) 2000 mg in sterile water 20 mL IV syringe  2,000 mg IntraVENous On Call to Des Taylor MD        scopolamine (TRANSDERM-SCOP) transdermal patch 1 patch  1 patch TransDERmal Once Irish Ellis MD           Allergies:     Allergies   Allergen Reactions    Doxycycline Itching    Lisinopril Itching    Penicillins Rash Problem List:    Patient Active Problem List   Diagnosis Code    Rash of genitalia R21    Skin lesion of left arm L98.9    Chronic intractable headache R51.9, G89.29    Menstrual bleeding problem N93.9    Lipoma of thigh D17.20    Urinary tract infection without hematuria N39.0    DDD (degenerative disc disease), cervical M50.30    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 9) E66.9    Neuropathy G62.9       Past Medical History:        Diagnosis Date    Anxiety and depression     medication    Calculus of kidney     Chronic pain     Diabetes mellitus ([facility] [address]     \"borderline\" oral agents, does not check BS; started taking medications 7/7/22, A1c 5.9 5/3/22    Elevated liver enzymes 05/17/2022 5/17/22 states at PCP now to discuss- AST 39, ALT 53    Elevated liver enzymes 07/07/2022    Former smoker, stopped smoking in distant past     quit 2016  0.5 ppd x 32 years    GERD (gastroesophageal reflux disease)     nexium daily, 2-3 pillows qhs as needed, hiatal hernia    History of kidney stones     last one 2007    Hypertension     medication    Migraines     controlled with med    Neuropathy     PONV (postoperative nausea and vomiting)     medication relieves; \"sensitive stomach\" request IV medications    Psychiatric disorder     aniexty    Restless legs syndrome (RLS)        Past Surgical History:        Procedure Laterality Date    ACHILLES TENDON SURGERY  2022    surgery    CERVICAL FUSION      ACDF C3-C4 and previous neck surgery    CHOLECYSTECTOMY, LAPAROSCOPIC  2000    COLONOSCOPY      ENDOMETRIAL ABLATION  15 yrs ago    Ablation    ENDOSCOPY, COLON, DIAGNOSTIC      LUMBAR LAMINECTOMY  2002    L4-L5 herniated disk    NOSE SURGERY      4 surgies to repair nose    TUBAL LIGATION   age 25    UROLOGICAL SURGERY  2007    cysto        Social History:    Social History     Tobacco Use    Smoking status: Former     Packs/day: 0.50     Types: Cigarettes     Quit date: 3/17/2016     Years since quittin.3    Smokeless tobacco: Never   Substance Use Topics    Alcohol use: No                                Counseling given: Not Answered      Vital Signs (Current):   Vitals:    07/15/22 1051   BP: 120/81   Pulse: 74   Resp: 16   Temp: 98.9 °F (37.2 °C)   TempSrc: Oral   SpO2: 98%   Weight: 199 lb 6.4 oz (90.4 kg)   Height: 5' 2.5\" (1.588 m)                                              BP Readings from Last 3 Encounters:   07/15/22 120/81   22 (!) 145/76   22 128/85       NPO Status: Time of last liquid consumption: 0000                        Time of last solid consumption: 0000                        Date of last liquid consumption: 22                        Date of last solid food consumption: 22    BMI:   Wt Readings from Last 3 Encounters:   07/15/22 199 lb 6.4 oz (90.4 kg)   22 201 lb 12.8 oz (91.5 kg)   22 200 lb (90.7 kg)     Body mass index is 35.89 kg/m².     CBC:   Lab Results   Component Value Date/Time    WBC 7.1 2022 10:00 AM    RBC 4.40 2022 10:00 AM    HGB 10.7 2022 10:00 AM    HCT 35.2 2022 10:00 AM    MCV 80.0 2022 10:00 AM    RDW 14.6 2022 10:00 AM     2022 10:00 AM       CMP:   Lab Results   Component Value Date/Time     2022 10:00 AM    K 3.5 2022 10:00 AM     2022 10:00 AM    CO2 30 2022 10:00 AM    BUN 19 2022 10:00 AM    CREATININE 1.06 2022 10:00 AM    GFRAA >60 2022 10:00 AM    AGRATIO 0.7 2019 12:07 PM    LABGLOM 57 2022 10:00 AM    GLUCOSE 80 2022

## 2022-07-15 NOTE — OP NOTE
[facility]  OPERATIVE REPORT    Name:  Mandy Carlson  MR#:  [id number]  :  1965  ACCOUNT #:  [de-identified]  DATE OF SERVICE:  07/15/2022    PREOPERATIVE DIAGNOSIS:  Chronic pain syndrome. POSTOPERATIVE DIAGNOSIS:  Chronic pain syndrome. PROCEDURES PERFORMED:  1. Implantation of neural spinal stimulator electrode paddle, [employer]. 2.  Implantation of neural spinal stimulator pulse generator, [employer]. 3.  Continuous intraoperative fluoroscopy. SURGEON:  Jany Ott MD    ASSISTANT:  None. ANESTHESIA:  General endotracheal.    COMPLICATIONS:  None. SPECIMENS REMOVED:  None. IMPLANTS:  See below. ESTIMATED BLOOD LOSS:  Minimal.    PREPARATION:  ChloraPrep. HISTORY OF PRESENT ILLNESS:  A 67-year-old lady with chronic pain syndrome refractory to multiple courses of aggressive conservative therapy. Spinal cord stimulator trial was highly beneficial and she desires permanent implantation. PROCEDURE:  The patient was brought to the operating room, was carefully placed under general endotracheal anesthesia without complications, carefully turned prone on the OSI bed [facility]. AP fluoroscopy was used to locate the T11 and T12 levels. The entry point for the laminectomy, the paddle trial, was passed at T10. The entire back was prepped and draped in usual sterile fashion. A midline incision was drawn out from T10 to T12, and in the left lower quadrant, the battery incision was drawn out. Both were infiltrated with 1% Xylocaine with epinephrine. The thoracic incision was made first with a 15-blade down to the spinous processes. Muscles and fascia were stripped in bilateral subperiosteal plane with cautery and elevators and deep retractors were placed. AP fluoroscopy confirmed the correct localization at the T11-12 junction.   Next, T11 laminectomy was carried out with Leksell rongeurs and 3-mm Kerrison rongeurs and a wide laminectomy procedure well, was turned supine, awakened, extubated, taken to PACU in stable condition. There were no obvious complications.       MD GILBERT [id number]  D:  07/15/2022 14:31  T:  07/15/2022 17:00  JOB #:  [id number]

## 2022-07-15 NOTE — BRIEF OP NOTE
Brief Postoperative Note      Patient: Lorena Rocha  YOB: 1965  MRN: [id number]    Date of Procedure: 7/15/2022    Pre-Op Diagnosis: Chronic pain syndrome [G89.4]    Post-Op Diagnosis: Same       Procedure(s):  STIMULATOR INSERTION-Spinal Cord Stimulator Placement    Surgeon(s):  Angie Johnson MD    Assistant:  * No surgical staff found *    Anesthesia: General    Estimated Blood Loss (mL): Minimal    Complications: None    Specimens:   * No specimens in log *    Implants:  Implant Name Type Inv.  Item Serial No.  Lot No. LRB No. Used Action   LEAD NEUROSTIMULATOR L60CM MR CONDITIONAL PENTA - [id number]  LEAD NEUROSTIMULATOR L60CM MR CONDITIONAL PENTA [id number] [employer]-WD  N/A 1 Implanted   GENERATOR NEUROSTIMULATOR 304CUCM [id number] - [id number]  GENERATOR NEUROSTIMULATOR 304CUCM [id number] [id number]  N/A 1 Implanted         Drains: * No LDAs found *    Findings: 10 PLACEMENT    Electronically signed by Henok Ames MD on 7/15/2022 at 2:11 PM

## 2022-07-15 NOTE — PROGRESS NOTES
VANCO DAILY FOLLOW UP NOTE  [address] [facility] Pharmacokinetic Monitoring Service - Vancomycin    Consulting Provider: Dr. Margaret Hardy   Indication: surgical prophylaxis  Target Concentration: Goal AUC/GRAHAM 400-600 mg*hr/L  Day of Therapy: 1  Additional Antimicrobials: none    Pertinent Laboratory Values: Wt Readings from Last 1 Encounters:   07/15/22 199 lb 6.4 oz (90.4 kg)     Temp Readings from Last 1 Encounters:   07/15/22 97.5 °F (36.4 °C) (Oral)     Recent Labs     07/15/22  1707   CREATININE 1.20*     Estimated Creatinine Clearance: 54 mL/min (A) (based on SCr of 1.2 mg/dL (H)). No results found for: [facility]    MRSA Nasal Swab: N/A. Non-respiratory infection.     Assessment:  Date/Time Dose Concentration AUC         Note: Serum concentrations collected for AUC dosing may appear elevated if collected in close proximity to the dose administered, this is not necessarily an indication of toxicity    Plan:  Dosing recommendations based on Bayesian software  Start vancomycin 1500 mg q24h  Anticipated AUC of 477 and trough concentration of 13.9 at steady state  Renal labs as indicated   Vancomycin concentrations will be ordered as clinically appropriate   Pharmacy will continue to monitor patient and adjust therapy as indicated    Thank you for the consult,  Oral Shirts, GONZALEZ [facility]

## 2022-07-15 NOTE — PLAN OF CARE
Problem: Chronic Conditions and Co-morbidities  Goal: Patient's chronic conditions and co-morbidity symptoms are monitored and maintained or improved  7/15/2022 1958 by Yas Glasgow RN  Outcome: Progressing  7/15/2022 1728 by Abigail Yoon RN  Outcome: Progressing  Flowsheets  Taken 7/15/2022 1639 by Gabby Villareal 34 - Patient's Chronic Conditions and Co-Morbidity Symptoms are Monitored and Maintained or Improved: Monitor and assess patient's chronic conditions and comorbid symptoms for stability, deterioration, or improvement  Taken 7/15/2022 1554 by Gabby Sidhu 34 - Patient's Chronic Conditions and Co-Morbidity Symptoms are Monitored and Maintained or Improved: Monitor and assess patient's chronic conditions and comorbid symptoms for stability, deterioration, or improvement     Problem: Pain  Goal: Verbalizes/displays adequate comfort level or baseline comfort level  7/15/2022 1958 by Yas Glasgow RN  Outcome: Progressing  7/15/2022 1728 by Abigail Yoon RN  Outcome: Progressing     Problem: Discharge Planning  Goal: Discharge to home or other facility with appropriate resources  7/15/2022 1958 by Yas Glasgow RN  Outcome: Progressing  7/15/2022 1728 by Abigail Yoon RN  Outcome: Progressing  Flowsheets (Taken 7/15/2022 1639)  Discharge to home or other facility with appropriate resources: Identify barriers to discharge with patient and caregiver     Problem: Safety - Adult  Goal: Free from fall injury  7/15/2022 1958 by Yas Glasgow RN  Outcome: Progressing  7/15/2022 1728 by Abigail Yoon RN  Outcome: Progressing     Problem: ABCDS Injury Assessment  Goal: Absence of physical injury  7/15/2022 1958 by Yas Glasgow RN  Outcome: Progressing  7/15/2022 1728 by Abigail Yoon RN  Outcome: Progressing

## 2022-07-15 NOTE — ANESTHESIA PROCEDURE NOTES
Airway  Date/Time: 7/15/2022 12:47 PM  Urgency: elective    Airway not difficult    General Information and Staff    Patient location during procedure: OR  Performed: resident/CRNA     Indications and Patient Condition  Indications for airway management: anesthesia  Spontaneous Ventilation: absent  Sedation level: deep  Preoxygenated: yes  Patient position: sniffing  MILS not maintained throughout  Mask difficulty assessment: vent by bag mask    Final Airway Details  Final airway type: endotracheal airway      Successful airway: ETT  Cuffed: yes   Successful intubation technique: direct laryngoscopy  Facilitating devices/methods: intubating stylet  Endotracheal tube insertion site: oral  Blade: Chiang  Blade size: #3  ETT size (mm): 7.0  Cormack-Lehane Classification: grade I - full view of glottis  Placement verified by: chest auscultation and capnometry   Measured from: lips  Number of attempts at approach: 1  Ventilation between attempts: bag mask  Number of other approaches attempted: 0    no

## 2022-07-15 NOTE — PERIOP NOTE
TRANSFER - OUT REPORT:    Verbal report given to [facility] AT [facility] RN on Elayne Rashid  being transferred to  for routine progression of patient care       Report consisted of patients Situation, Background, Assessment and   Recommendations(SBAR). Information from the following report(s) Nurse Handoff Report, Adult Overview, Surgery Report, Intake/Output, MAR, Recent Results, and Cardiac Rhythm NSR  was reviewed with the receiving nurse. Lines:   Peripheral IV 07/15/22 Left;Posterior Wrist (Active)   Site Assessment Clean, dry & intact 07/15/22 1454   Line Status Infusing 07/15/22 1114   Phlebitis Assessment No symptoms 07/15/22 1454   Infiltration Assessment 0 07/15/22 1454   Alcohol Cap Used No 07/15/22 1454   Dressing Status Clean, dry & intact 07/15/22 1454   Dressing Type Transparent 07/15/22 1454        Opportunity for questions and clarification was provided. Patient transported with:   O2 @ 2 liters  Tech    VTE prophylaxis orders have been written for Elayne Rashid. Patient and family given floor number and nurses name. Family updated re: pt status after security code verified.

## 2022-07-15 NOTE — ANESTHESIA POSTPROCEDURE EVALUATION
Department of Anesthesiology  Postprocedure Note    Patient: Lian Betancourt  MRN: [id number]  YOB: 1965  Date of evaluation: 7/15/2022      Procedure Summary     Date: 07/15/22 Room / Location: [facility] MAIN OR  / [facility] MAIN OR    Anesthesia Start: 1243 Anesthesia Stop: 1454    Procedure: STIMULATOR INSERTION-Spinal Cord Stimulator Placement (Spine Thoracic) Diagnosis:       Chronic pain syndrome      (Chronic pain syndrome [G89.4])    Providers: Edwin Aguillon MD Responsible Provider: Alton Johnson MD    Anesthesia Type: general ASA Status: 2          Anesthesia Type: No value filed.     Sherly Phase I: Sherly Score: 8    Sherly Phase II:        Anesthesia Post Evaluation    Patient location during evaluation: PACU  Patient participation: complete - patient participated  Level of consciousness: awake and awake and alert  Airway patency: patent  Nausea & Vomiting: no nausea  Complications: no  Cardiovascular status: hemodynamically stable  Respiratory status: acceptable  Hydration status: euvolemic

## 2022-07-15 NOTE — PROGRESS NOTES
TRANSFER - IN REPORT:    Verbal report received from Osmar Meadows RN on Rashi Arreola  being received from PACU for routine progression of patient care      Report consisted of patient's Situation, Background, Assessment and   Recommendations(SBAR). Information from the following report(s) Nurse Handoff Report was reviewed with the receiving nurse. Opportunity for questions and clarification was provided. Assessment completed upon patient's arrival to unit and care assumed.

## 2022-07-16 VITALS
HEIGHT: 63 IN | BODY MASS INDEX: 35.33 KG/M2 | WEIGHT: 199.4 LBS | TEMPERATURE: 99 F | HEART RATE: 79 BPM | SYSTOLIC BLOOD PRESSURE: 122 MMHG | RESPIRATION RATE: 17 BRPM | OXYGEN SATURATION: 96 % | DIASTOLIC BLOOD PRESSURE: 71 MMHG

## 2022-07-16 LAB — CREAT SERPL-MCNC: 1 MG/DL (ref 0.6–1)

## 2022-07-16 PROCEDURE — 36415 COLL VENOUS BLD VENIPUNCTURE: CPT

## 2022-07-16 PROCEDURE — 6360000002 HC RX W HCPCS: Performed by: NEUROLOGICAL SURGERY

## 2022-07-16 PROCEDURE — 6370000000 HC RX 637 (ALT 250 FOR IP): Performed by: NEUROLOGICAL SURGERY

## 2022-07-16 PROCEDURE — 97530 THERAPEUTIC ACTIVITIES: CPT

## 2022-07-16 PROCEDURE — 2500000003 HC RX 250 WO HCPCS: Performed by: NEUROLOGICAL SURGERY

## 2022-07-16 PROCEDURE — 97161 PT EVAL LOW COMPLEX 20 MIN: CPT

## 2022-07-16 PROCEDURE — 82565 ASSAY OF CREATININE: CPT

## 2022-07-16 PROCEDURE — 2580000003 HC RX 258: Performed by: NEUROLOGICAL SURGERY

## 2022-07-16 RX ADMIN — ANTI-FUNGAL POWDER MICONAZOLE NITRATE TALC FREE: 1.42 POWDER TOPICAL at 09:45

## 2022-07-16 RX ADMIN — OXYCODONE 10 MG: 5 TABLET ORAL at 12:26

## 2022-07-16 RX ADMIN — PANTOPRAZOLE SODIUM 40 MG: 40 TABLET, DELAYED RELEASE ORAL at 09:20

## 2022-07-16 RX ADMIN — AMLODIPINE BESYLATE 10 MG: 10 TABLET ORAL at 09:20

## 2022-07-16 RX ADMIN — VANCOMYCIN HYDROCHLORIDE 1500 MG: 10 INJECTION, POWDER, LYOPHILIZED, FOR SOLUTION INTRAVENOUS at 12:54

## 2022-07-16 RX ADMIN — BUSPIRONE HYDROCHLORIDE 15 MG: 5 TABLET ORAL at 09:20

## 2022-07-16 RX ADMIN — OXYCODONE 10 MG: 5 TABLET ORAL at 00:01

## 2022-07-16 RX ADMIN — ONDANSETRON 4 MG: 4 TABLET, ORALLY DISINTEGRATING ORAL at 00:01

## 2022-07-16 ASSESSMENT — PAIN SCALES - GENERAL
PAINLEVEL_OUTOF10: 0
PAINLEVEL_OUTOF10: 8
PAINLEVEL_OUTOF10: 0
PAINLEVEL_OUTOF10: 7
PAINLEVEL_OUTOF10: 0
PAINLEVEL_OUTOF10: 0

## 2022-07-16 ASSESSMENT — PAIN DESCRIPTION - LOCATION: LOCATION: ABDOMEN;FLANK

## 2022-07-16 ASSESSMENT — PAIN DESCRIPTION - ORIENTATION: ORIENTATION: LEFT

## 2022-07-16 ASSESSMENT — PAIN - FUNCTIONAL ASSESSMENT: PAIN_FUNCTIONAL_ASSESSMENT: ACTIVITIES ARE NOT PREVENTED

## 2022-07-16 ASSESSMENT — PAIN DESCRIPTION - DESCRIPTORS: DESCRIPTORS: ACHING

## 2022-07-16 NOTE — CARE COORDINATION
Discharge order is in. Pt presented to [facility] for Spinal Cord Stimulator Placement. CM chart reviewed. PT consulted and recommended no further skilled therapy was needed. PCP confirmed. Insurance verified. No discharge needs identified. Tx goals met.       07/16/22 1400   Discharge Planning   Patient expects to be discharged to: [facility] Discharge   Services At/After Discharge None   [employer] Information Provided? No   Mode of Transport at Discharge Other (see comment)   Confirm Follow Up Transport Family   Condition of Participation: Discharge Planning   The Patient and/or Patient Representative was provided with a Choice of Provider? Patient   The Patient and/Or Patient Representative agree with the Discharge Plan? Yes   Freedom of Choice list was provided with basic dialogue that supports the patient's individualized plan of care/goals, treatment preferences, and shares the quality data associated with the providers?   Yes

## 2022-07-16 NOTE — PROGRESS NOTES
PHYSICAL THERAPY Initial Assessment and PM  (Link to Caseload Tracking: PT Visit Days : 1  Acknowledge Orders  Time In/Out  PT Charge Capture  Rehab Caseload Tracker    Stanley Patel is a 60 y.o. female   PRIMARY DIAGNOSIS: Chronic pain syndrome  Chronic pain syndrome [G89.4]  Procedure(s) (LRB):  STIMULATOR INSERTION-Spinal Cord Stimulator Placement (N/A)  1 Day Post-Op  Reason for Referral: Generalized Muscle Weakness (M62.81)  Difficulty in walking, Not elsewhere classified (R26.2)  Outpatient in a bed: Payor: Alejandro Rashid / Plan: Christopher Pretzel / Product Type: *No Product type* /     ASSESSMENT:     REHAB RECOMMENDATIONS:   Recommendation to date pending progress:  Setting:  No further skilled therapy after discharge from hospital    Equipment:    None     ASSESSMENT:  Ms. Brandon Darden was supine at arrival agrees to treat. After treatment it was discovered pt had dc. She was able to get to EOB, stand and walk with no balance difficulties including turns being connected to IV. She returned to sitting EOB. Discussed questions pt had about safety at house. Pt is IND and will be discharged from PT nakia Handy discharge to home.       [address] [facility] [address] [address] AM-PAC 6 Clicks Basic Mobility Inpatient Short Form  AM-PAC Mobility Inpatient   How much difficulty turning over in bed?: None  How much difficulty sitting down on / standing up from a chair with arms?: None  How much difficulty moving from lying on back to sitting on side of bed?: None  How much help from another person moving to and from a bed to a chair?: None  How much help from another person needed to walk in hospital room?: None  How much help from another person for climbing 3-5 steps with a railing?: None  AM-PAC Inpatient Mobility Raw Score : 24  AM-PAC Inpatient T-Scale Score : 61.14  Mobility Inpatient CMS 0-100% Score: 0  Mobility Inpatient CMS G-Code Modifier : CH    SUBJECTIVE:   Ms. Brandon Darden states, \"Im doing OK\"     Social/Functional Lives With: Spouse  Type of Home: House  Home Layout: One level  ADL Assistance: Independent  Homemaking Assistance: Independent  Ambulation Assistance: Independent  Transfer Assistance: Independent  Active : Yes  Mode of Transportation: Car    OBJECTIVE:     PAIN: Yvette [place] / O2: Hoff Stall / Suzzane Modest / DRAINS:   Pre Treatment:   Pain Level: 0      Post Treatment: 0 Vitals        Oxygen      IV    RESTRICTIONS/PRECAUTIONS:                    GROSS EVALUATION: Intact Impaired (Comments):   AROM [x]     PROM []    Strength [x]     Balance [x]     Posture [] Forward Head  Rounded Shoulders   Sensation []     Coordination []      Tone []     Edema []    Activity Tolerance [x]      []      COGNITION/  PERCEPTION: Intact Impaired (Comments):   Orientation [x]     Vision [x]     Hearing [x]     Cognition  [x]       MOBILITY: I Mod I S SBA CGA Min Mod Max Total  NT x2 Comments:   Bed Mobility    Rolling [] [] [x] [] [] [] [] [] [] [] []    Supine to Sit [] [] [x] [] [] [] [] [] [] [] []    Scooting [] [] [x] [] [] [] [] [] [] [] []    Sit to Supine [] [] [] [] [] [] [] [] [] [] []    Transfers    Sit to Stand [] [] [x] [] [] [] [] [] [] [] []    Bed to Chair [] [] [x] [] [] [] [] [] [] [] []    Stand to Sit [] [] [x] [] [] [] [] [] [] [] []     [] [] [] [] [] [] [] [] [] [] []    I=Independent, Mod I=Modified Independent, S=Supervision, SBA=Standby Assistance, CGA=Contact Guard Assistance,   Min=Minimal Assistance, Mod=Moderate Assistance, Max=Maximal Assistance, Total=Total Assistance, NT=Not Tested    GAIT: I Mod I S SBA CGA Min Mod Max Total  NT x2 Comments:   Level of Assistance [] [] [x] [] [] [] [] [] [] [] []    Distance 50 feet    DME None    Gait Quality N/A    Weightbearing Status      Stairs      I=Independent, Mod I=Modified Independent, S=Supervision, SBA=Standby Assistance, CGA=Contact Guard Assistance,   Min=Minimal Assistance, Mod=Moderate Assistance, Max=Maximal Assistance, Total=Total Assistance, NT=Not Tested    PLAN:   ACUTE PHYSICAL THERAPY GOALS:   (Developed with and agreed upon by patient and/or caregiver.)  dc    FREQUENCY AND DURATION:   for duration of hospital stay or until stated goals are met, whichever comes first.    THERAPY PROGNOSIS: Good    PROBLEM LIST:   (Skilled intervention is medically necessary to address:)  NA INTERVENTIONS PLANNED:   (Benefits and precautions of physical therapy have been discussed with the patient.)  Education       TREATMENT:   EVALUATION: LOW COMPLEXITY: (Untimed Charge)    TREATMENT:   Therapeutic Activity (12 Minutes): Therapeutic activity included Rolling, Supine to Sit, Sit to Supine, Transfer Training, Ambulation on level ground, Sitting balance , and Standing balance to improve functional Activity tolerance, Balance, Coordination, Mobility, Strength, and ROM.     TREATMENT GRID:  N/A    AFTER TREATMENT PRECAUTIONS: Bed, Bed/Chair Locked, Call light within reach, Needs within reach, and Visitors at bedside    INTERDISCIPLINARY COLLABORATION:  RN/ PCT and PT/ PTA    EDUCATION: Education Given To: Patient  Education Provided: Role of Therapy;Plan of Care  Education Method: Verbal  Barriers to Learning: None  Education Outcome: Verbalized understanding    TIME IN/OUT:  Time In: 1400  Time Out: [address]  Minutes: [address] N[address] Aparicio PT

## 2022-07-16 NOTE — PROGRESS NOTES
VANCO DAILY FOLLOW UP NOTE  [address] [facility] Pharmacokinetic Monitoring Service - Vancomycin    Consulting Provider: Dr. Catarina Sherman   Indication: surgical prophylaxis  Target Concentration: Goal AUC/GRAHAM 400-600 mg*hr/L  Day of Therapy: 2  Additional Antimicrobials: none    Pertinent Laboratory Values: Wt Readings from Last 1 Encounters:   07/15/22 199 lb 6.4 oz (90.4 kg)     Temp Readings from Last 1 Encounters:   07/16/22 99 °F (37.2 °C) (Oral)     Recent Labs     07/15/22  1707 07/16/22  0332   CREATININE 1.20* 1.00     Estimated Creatinine Clearance: 65 mL/min (based on SCr of 1 mg/dL). No results found for: Donny Blount    MRSA Nasal Swab: N/A. Non-respiratory infection.     Assessment:  Date/Time Dose Concentration AUC         Note: Serum concentrations collected for AUC dosing may appear elevated if collected in close proximity to the dose administered, this is not necessarily an indication of toxicity    Plan:  Dosing recommendations based on Bayesian software  Start vancomycin 1500 mg q24h  Anticipated AUC of 477 and trough concentration of 13.9 at steady state  Renal labs as indicated   Vancomycin concentration ordered for 7/17 @ 0400  Pharmacy will continue to monitor patient and adjust therapy as indicated    Thank you for the consult,  Ivana Villagran [place] [facility]

## 2022-07-21 ENCOUNTER — NURSE ONLY (OUTPATIENT)
Dept: NEUROSURGERY | Age: 57
End: 2022-07-21

## 2022-07-21 ENCOUNTER — OFFICE VISIT (OUTPATIENT)
Dept: NEUROSURGERY | Age: 57
End: 2022-07-21

## 2022-07-21 ENCOUNTER — TELEPHONE (OUTPATIENT)
Dept: NEUROSURGERY | Age: 57
End: 2022-07-21

## 2022-07-21 VITALS
TEMPERATURE: 97.3 F | BODY MASS INDEX: 35.97 KG/M2 | WEIGHT: 203 LBS | HEIGHT: 63 IN | SYSTOLIC BLOOD PRESSURE: 114 MMHG | DIASTOLIC BLOOD PRESSURE: 59 MMHG | HEART RATE: 65 BPM | OXYGEN SATURATION: 99 %

## 2022-07-21 DIAGNOSIS — T81.30XA WOUND DEHISCENCE: ICD-10-CM

## 2022-07-21 DIAGNOSIS — Z09 SURGICAL FOLLOW-UP CARE: ICD-10-CM

## 2022-07-21 DIAGNOSIS — Z96.89 STATUS POST INSERTION OF SPINAL CORD STIMULATOR: Primary | ICD-10-CM

## 2022-07-21 DIAGNOSIS — L24.A9 WOUND DRAINAGE: ICD-10-CM

## 2022-07-21 DIAGNOSIS — L24.A9 WOUND DRAINAGE: Primary | ICD-10-CM

## 2022-07-21 PROCEDURE — 99024 POSTOP FOLLOW-UP VISIT: CPT | Performed by: NEUROLOGICAL SURGERY

## 2022-07-21 RX ORDER — SULFAMETHOXAZOLE AND TRIMETHOPRIM 800; 160 MG/1; MG/1
1 TABLET ORAL 2 TIMES DAILY
Qty: 20 TABLET | Refills: 0 | Status: ON HOLD | OUTPATIENT
Start: 2022-07-21 | End: 2022-07-27 | Stop reason: HOSPADM

## 2022-07-21 NOTE — PROGRESS NOTES
Subjective: Patient walked into the office today with c/o generator site bleeding and right posterior leg pain. Patient states the drainage started last evening. Consent: verbal consent obtained and given by the patient. Risks of removal include bleeding,infection,reopening and excessive scarring. Objective:  BP (!) 114/59 (Site: Left Upper Arm)   Pulse 65   Temp 97.3 °F (36.3 °C) (Temporal)   Ht 5' 2.5\" (1.588 m)   Wt 203 lb (92.1 kg)   SpO2 99%   BMI 36.54 kg/m²   Pain Scale: 10 - Worst pain ever/10    General: patient is cooperative; mood and affect are appropriate; denies any fevers,chills or headache; denies any fatigue or weakness  HEENT:trachea is midline; no voice, visual or swallowing difficulty; good range of motion with neck; mucous membranes are normal  Neuro:alert and oriented; denies any dizziness; gait and coordination observed are normal; ambulates without assistance  Other finding- she states the generator was turned on to low yesterday  Wound:thoracic Exofen dressing removed - well approximated but distal end pin size area of small dehiscence; left generator Aquacel dressing removed for a moist red , burn -like, swollen incisional line- slightly unapproxiamted with red tan drainage seepage. Cellulitis redness noted to left flank and left lateral back area with a red tract noted from generator site to thoracic incisional site  Number of sutures/staples removed:none    Assessment:  Post op check: Patient is here for initial post-op check. Dr. Yessy Reid  is the supervising physician in clinic today and approves of today's treatment plan; Dr. Yessy Reid examined patient- large amount of tan bloody drainage expressed from generator site. Cleansed all incisions with chloraprep , benzoin applied and steri strips placed. Telfa dressings applied. Verbal orders of DR. Moseley, daily dressing changes and start Bactrim DS BID; after expression of drainage patient states right leg pain was improved      Plan:  Post-op instructions: strict 2-5 pound weight limit; frequent positional changes; no bending ,lifting or twisting;instructed on proper body mechanics; no extreme overhead work x 6-8 weeks ;instructed on no driving  Incisional care: not to saturate and pat dry;do not itch scratch or submerge; prn dressings changes; if any changes in drainage, wound or if develops fevers and chills to seek medical attention  Management and expected pain:advised patient she may have to contact her SCS rep to decrease settings due to leg pain  Post-op complications: patient instructed to observe for infection, deep vein thrombosis,constipation and pneumonia  Therapy/exercises: start a walking program;   Return appointment with any follow-up films:Patient demonstrated knowledge of procedure and post op instructions; patient was given the opportunity for questions and clarification was provided

## 2022-07-21 NOTE — TELEPHONE ENCOUNTER
Pt came in office complaining of lbp radiating down right leg. Bleeding on dressing. Pt had Stimulator insertion surgery on Friday 7.15.22. Pt is 10/10. Pt taking medication as directed Oxycodone 7.5mg 1 every 6 hrs as needed for pain. Please advise.

## 2022-07-21 NOTE — PATIENT INSTRUCTIONS
Spinal Cord Stimulator Post Op Instructions     1. 2-5 pound weight limit, no bending, twisting, over-head lifting, ladder climbing or sitting more than 30-45 min; may do steps and inclines; please avoid abrupt sudden position changes. Strict restrictions for 6 weeks after surgery. 2. Wash incisions every day with an antibacterial soap- showers only, no tub bath, pool, or hot tub. Do not put anything on the incisions for one week after surgery. 3. Observe for signs of infection: fever > 101, drainage, increase in swelling, headache, redness, or tenderness. 4.No driving after surgery for one week. 5.No sleeping on your stomach. You may sleep side to side or on your back. 6. Able to walk as much as you want. 7. Do expect surgical pain especially at incision sites- especially thoracic pain that might radiate to the chest wall/rib area. 8. Please do not expect adequate stimulator immediately after surgery due to post op swelling. Do expect very mild \"vibration\" sensation in your spine. 9. Please carry an ID card with you at all times. You may want to ask your pharmacist about an ID bracelet. You will set off alarm system. 10. Check with your SCS representative if able to have MRI's. 11. Maintain a good diet. 12. If you have a back binder, you may wear this for 6 weeks but it is not necessary when walking. 13. After 6 weeks, you may gradually resume your normal activity. 14. You will be given one postoperative presription for pain control. Any further pain medication will need to come from your pain management physician. 15. Cervical SCS- hard collar needs to be worn at all times, except to shower, for 6 weeks. 16. Resist scratching incision due to possibility of infection  17. Sexual relations are to avoided for one month  18. For any reprogramming or adjustments to the device, contact your company representative.

## 2022-07-21 NOTE — PROGRESS NOTES
LUMBAR LAMINECTOMY  2002    L4-L5 herniated disk    NOSE SURGERY      4 surgies to repair nose    STIMULATOR SURGERY N/A 7/15/2022    STIMULATOR INSERTION-Spinal Cord Stimulator Placement performed by Sathish George MD at [facility] MAIN OR    TUBAL LIGATION   age 25    UROLOGICAL SURGERY  2007    cysto      Allergies   Allergen Reactions    Doxycycline Itching    Lisinopril Itching    Penicillins Rash      Family History   Problem Relation Age of Onset    Hypertension Mother     Hypertension Father     Kidney Disease Father     Stroke Mother     Hypertension Sister     Heart Disease Father       Social History     Socioeconomic History    Marital status:      Spouse name: Not on file    Number of children: Not on file    Years of education: Not on file    Highest education level: Not on file   Occupational History    Not on file   Tobacco Use    Smoking status: Former     Packs/day: 0.50     Types: Cigarettes     Quit date: 3/17/2016     Years since quittin.3    Smokeless tobacco: Never   Vaping Use    Vaping Use: Never used   Substance and Sexual Activity    Alcohol use: No    Drug use: No    Sexual activity: Not on file   Other Topics Concern    Not on file   Social History Narrative    No history of physical or sexual abuse feels safe at home     Social Determinants of Health     Financial Resource Strain: Not on file   Food Insecurity: Not on file   Transportation Needs: Not on file   Physical Activity: Not on file   Stress: Not on file   Social Connections: Not on file   Intimate Partner Violence: Not on file   Housing Stability: Not on file     Current Outpatient Medications   Medication Sig Dispense Refill    oxyCODONE-acetaminophen (PERCOCET) 7.5-325 MG per tablet Take 1 tablet by mouth every 6 hours as needed for Pain for up to 20 days.  Intended supply: 30 days 90 tablet 0    amLODIPine (NORVASC) 5 MG tablet 10 mg daily      chlorthalidone (HYGROTON) 25 MG tablet daily      diclofenac sodium (VOLTAREN) 1 % GEL Apply 1-2 g topically as needed      ipratropium (ATROVENT) 0.06 % nasal spray as needed      ketoconazole (NIZORAL) 2 % shampoo as needed      linaCLOtide (LINZESS) 72 MCG CAPS capsule daily      metFORMIN (GLUCOPHAGE-XR) 500 MG extended release tablet Take 500 mg by mouth daily      nystatin (MYCOSTATIN) 886156 UNIT/GM powder as needed      ondansetron (ZOFRAN-ODT) 4 MG disintegrating tablet as needed      sucralfate (CARAFATE) 1 GM tablet 3 times daily as needed      valsartan (DIOVAN) 80 MG tablet daily      busPIRone (BUSPAR) 15 MG tablet Take 15 mg by mouth in the morning and at bedtime       clonazePAM (KLONOPIN) 0.5 MG tablet TAKE ONE TABLET BY MOUTH TWICE A DAY AS NEEDED FOR ANXIETY      clotrimazole-betamethasone (LOTRISONE) 1-0.05 % cream Apply topically 2 times daily as needed       cyclobenzaprine (FLEXERIL) 10 MG tablet Take 10 mg by mouth 3 times daily as needed       dicyclomine (BENTYL) 10 MG capsule TAKE ONE CAPSULE BY MOUTH FOUR TIMES A DAY AS NEEDED      esomeprazole (NEXIUM) 40 MG delayed release capsule Take 40 mg by mouth daily      gabapentin (NEURONTIN) 400 MG capsule Take 400 mg by mouth 3 times daily as needed. Probiotic Product (ACIDOPHILUS PROBIOTIC) CAPS capsule Take by mouth daily      rizatriptan (MAXALT-MLT) 10 MG disintegrating tablet Place 10 mg under the tongue as needed       valACYclovir (VALTREX) 1 g tablet Take 1,000 mg by mouth as needed       No current facility-administered medications for this visit.      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 9)    Neuropathy        Review of Systems    Physical Exam:  Vitals Reviewed      Head normocephalic and atraumatic  Mood and affect appropriate  General: No acute distress  Midline thoracic incision with small area of inferior superficial dehiscence about the size of a Q-tip in without drainage no significant erythremia there is some erythema along the tunneling order on the left flank to the pulse generator incision. Serosanguineous fluid was able to be expressed through the implantable pulse generator incision until no further serosanguineous fluid could be expressed. The wound was cleaned and dressed with Steri-Strips the pulse generator incision did not have any significant erythema after expression of the serosanguineous fluid. Patient with symmetric strength  Gait: normal nonantalgic gait, stands from a seated position without difficulty and ambulates independently    Assessment & Plan:  Stanley Patel is a 60 y.o. female who presents today postoperative follow-up visit. She initially presented for wound check to her knees tonically intact requested I see the patient today and examine the patient's wound. The patient underwent thoracic laminectomy for implantation of a epidural spinal stimulator electrode paddle [email or web address] and implantation of a new area simulator implantable pulse generator on the left flank//gluteal region performed on 7/15/2022. She has had some drainage from her her generator site. She has not had any significant sustained fevers greater than 101.5. She has no new weakness or neurologic complaints. At this point the patient had a draining seroma that was resulting probably in some degree of erythema in the subcutaneous tissues.   At this point there is no concern for abdias infection however given the seroma that was expressed and the small superficial dehiscence of the midline wound I believe is prudent to start her on Bactrim double strength. I will have her reevaluated on Monday. She will monitor for elevations in temperature or any further persistent drainage or concerns. She will be back in to see nurse Shruthi Obando for a wound check on Monday. Dr. Brittney Lockett to resume postoperative care when he returns back to the office. ICD-10-CM    1. Status post insertion of spinal cord stimulator  Z96.89       2. Surgical follow-up care  Z09       3. Wound drainage  L24. A9       4. Wound dehiscence  T81.30XA         Ronan Connors, [address] [place] [address]     Notes are transcribed with [address] [place] [address], a medical voice recording dictation service, and may contain minor errors.

## 2022-07-22 ENCOUNTER — CLINICAL DOCUMENTATION (OUTPATIENT)
Dept: NEUROSURGERY | Age: 57
End: 2022-07-22

## 2022-07-22 NOTE — PROGRESS NOTES
Per Dr. Davian Oreilly keep pt on [facility] schedule for Monday and follow with [facility] plan of care.  He is back in town Monday

## 2022-07-25 ENCOUNTER — TELEPHONE (OUTPATIENT)
Dept: NEUROSURGERY | Age: 57
End: 2022-07-25

## 2022-07-25 ENCOUNTER — NURSE ONLY (OUTPATIENT)
Dept: NEUROSURGERY | Age: 57
End: 2022-07-25

## 2022-07-25 ENCOUNTER — PREP FOR PROCEDURE (OUTPATIENT)
Dept: NEUROSURGERY | Age: 57
End: 2022-07-25

## 2022-07-25 ENCOUNTER — CLINICAL DOCUMENTATION (OUTPATIENT)
Dept: NEUROSURGERY | Age: 57
End: 2022-07-25

## 2022-07-25 VITALS
OXYGEN SATURATION: 100 % | HEART RATE: 77 BPM | BODY MASS INDEX: 36.14 KG/M2 | SYSTOLIC BLOOD PRESSURE: 149 MMHG | WEIGHT: 204 LBS | DIASTOLIC BLOOD PRESSURE: 92 MMHG | HEIGHT: 63 IN | TEMPERATURE: 99.5 F

## 2022-07-25 DIAGNOSIS — G89.4 CHRONIC PAIN SYNDROME: ICD-10-CM

## 2022-07-25 DIAGNOSIS — Z96.89 STATUS POST INSERTION OF SPINAL CORD STIMULATOR: ICD-10-CM

## 2022-07-25 DIAGNOSIS — L24.A9 WOUND DRAINAGE: Primary | ICD-10-CM

## 2022-07-25 RX ORDER — ONDANSETRON 4 MG/1
4 TABLET, FILM COATED ORAL EVERY 8 HOURS PRN
Qty: 10 TABLET | Refills: 0 | Status: SHIPPED | OUTPATIENT
Start: 2022-07-25

## 2022-07-25 NOTE — TELEPHONE ENCOUNTER
DR. Josué Marie notified of patient's symptoms and patient wanting SCS removed- verbal orders of Dr. Josué Marie- scheduled for SCS removal on Wed 7-27-22 with dx of possible infection

## 2022-07-26 RX ORDER — DESVENLAFAXINE 25 MG/1
1 TABLET, EXTENDED RELEASE ORAL NIGHTLY
COMMUNITY
Start: 2022-07-12

## 2022-07-26 NOTE — H&P
[address] [place] [address]  HISTORY AND PHYSICAL    Name:  Martha Beck  MR#:  [id number]  :  1965  ACCOUNT #:  [de-identified]  ADMIT DATE:  2022    CHIEF COMPLAINT:  Allergic reaction to implanted metal.    HISTORY OF PRESENT ILLNESS:  A 38-year-old lady with chronic pain syndrome status post spinal cord stimulator placement 12 days ago with initially good results only to develop problems with metallurgic allergy. The patient states that since the device was put in, she has felt ill with nausea, shakes, tremors, fevers, and gastrointestinal upset. She presented to the office for evaluation. Very minimal hematoma present at the generator site. She was placed on prophylactic antibiotics without signs of infection. However, the patient feels that the device is making her unacceptably ill and desires to have it removed. We had long discussions about this, both with the patient as well as with the office staff with the patient. However, she feels adamant that this device is making her sick and needs to have it removed. ALLERGIES:  PENICILLIN AND DOXYCYCLINE. PAST MEDICAL HISTORY:  1. Chronic pain syndrome. 2.  Renal calculi. 3.  Former smoker. 4.  GERD. 5.  Kidney stones. 6.  Hypertension. 7.  Migraine headaches. 8.  Anxiety. 9.  Restless legs syndrome. 10.  Neuropathy. 11.  Obesity. General malaise is present. PHYSICAL EXAMINATION:  GENERAL:  Awake, alert and oriented. HEENT:  Unremarkable. Nose and throat clear. CHEST:  Clear bilaterally. CARDIAC:  Regular rate and rhythm. No murmurs or gallops. ABDOMEN:  Soft, benign, nontender. No masses. Bowel sounds positive. EXTREMITIES:  Free of deformities. BACK:  Well-healed incisions, thoracic and lumbar region. NEUROLOGIC:  Awake, alert, oriented x3. Cranial nerves II through XII intact. Motor strength 5/5. Symmetric reflexes. Normal sensation. Normal gait.     IMPRESSION:  Allergic reaction to spinal cord stimulator placement. History of obesity and neuropathy. PLAN:  Spinal cord stimulator removal.  Perioperative antibiotics. We will obtain cultures if there are any signs of infection. However, she does not appear to have infection at this time.       MD GILBERT [id number]  D:  07/26/2022 8:18  T:  07/26/2022 11:28  JOB #:  [id number]

## 2022-07-26 NOTE — PROGRESS NOTES
Subjective: Patient returns today for a wound check. She is very upset crying due to pain . All weekend she is c/o right leg pain thoracic site pain , both hands falling asleep, low grade fever, chills and nausea and vomiting. She states she can not tolerate the stimulator and would like it removed. The patient states the thoracic incisional area has been draining. The patient drinking fluids and eating are very limited. Magalis Morrow is a 60 y.o. female who presents today for wound check. She has a surgical incisions which is located at thoracic and left hip generator side. Current symptoms: as above, patient is very emotional needing to lay down due to discomfort. Interventions to date: Dr. Dl Lucero is in the OR today- perfect served Dr. Dl Lucero of patient symptoms, incisions and patient wanting the device out  Suture/Staple removal consent:verbal consent obtained and given by the patient. Risks of removal include bleeding,infection,re-opening and excessive scarring    Dr. Dl Lucero     is the supervising physician in clinic today and approves of today's treatment plan. Dr. Dl Lucero communicated thru [facility]  Objective:     BP (!) 149/92 (Site: Left Upper Arm)   Pulse 77   Temp 99.5 °F (37.5 °C) (Temporal)   Ht 5' 2.5\" (1.588 m)   Wt 204 lb (92.5 kg)   SpO2 100%   BMI 36.72 kg/m²     Wound:   Thoracic incision dressings removed for a scant amount of tan drainage- small unapproximated area distal of incisional line  Left generator site with decreased swelling- no drainage noted with steri strips intact         Assessment:     Wound check. Interventions today visual inspection  cleansing with betadine solution  application of sterile  Telfa dressing. Plan:     1. Per DR. Dl Lucero - ok to schedule removal on Wednesday- spoke to patient about hydration and Zofran per standing orders  2. Patient instructions were given. Patient to inform her SCS rep of situation  3.  Follow up: for schedule on Wednesday;  Redell Range to speak with patient over the phone

## 2022-07-26 NOTE — PERIOP NOTE
Directly informed patient and or family member of pre op arrival time 36 on 7/27. All questions answered. Pre op instructions reviewed. Left contact information for any additional questions or needs.

## 2022-07-27 ENCOUNTER — HOSPITAL ENCOUNTER (OUTPATIENT)
Age: 57
Setting detail: OUTPATIENT SURGERY
Discharge: HOME | End: 2022-07-27
Attending: NEUROLOGICAL SURGERY | Admitting: NEUROLOGICAL SURGERY
Payer: COMMERCIAL

## 2022-07-27 ENCOUNTER — ANESTHESIA EVENT (OUTPATIENT)
Dept: SURGERY | Age: 57
End: 2022-07-27
Payer: COMMERCIAL

## 2022-07-27 ENCOUNTER — ANESTHESIA (OUTPATIENT)
Dept: SURGERY | Age: 57
End: 2022-07-27
Payer: COMMERCIAL

## 2022-07-27 ENCOUNTER — TELEPHONE (OUTPATIENT)
Dept: NEUROSURGERY | Age: 57
End: 2022-07-27

## 2022-07-27 VITALS
SYSTOLIC BLOOD PRESSURE: 116 MMHG | HEART RATE: 75 BPM | TEMPERATURE: 97.4 F | RESPIRATION RATE: 18 BRPM | HEIGHT: 63 IN | OXYGEN SATURATION: 96 % | BODY MASS INDEX: 35.68 KG/M2 | DIASTOLIC BLOOD PRESSURE: 61 MMHG | WEIGHT: 201.38 LBS

## 2022-07-27 DIAGNOSIS — Z96.89: Primary | ICD-10-CM

## 2022-07-27 DIAGNOSIS — L24.A9 WOUND DRAINAGE: ICD-10-CM

## 2022-07-27 LAB
GLUCOSE BLD STRIP.AUTO-MCNC: 96 MG/DL (ref 65–100)
SERVICE CMNT-IMP: NORMAL

## 2022-07-27 PROCEDURE — 6360000002 HC RX W HCPCS: Performed by: NEUROLOGICAL SURGERY

## 2022-07-27 PROCEDURE — 2709999900 HC NON-CHARGEABLE SUPPLY: Performed by: NEUROLOGICAL SURGERY

## 2022-07-27 PROCEDURE — 7100000010 HC PHASE II RECOVERY - FIRST 15 MIN: Performed by: NEUROLOGICAL SURGERY

## 2022-07-27 PROCEDURE — 63688 REV/RMV IMP SP NPG/R DTCH CN: CPT | Performed by: NEUROLOGICAL SURGERY

## 2022-07-27 PROCEDURE — 3700000001 HC ADD 15 MINUTES (ANESTHESIA): Performed by: NEUROLOGICAL SURGERY

## 2022-07-27 PROCEDURE — 3600000002 HC SURGERY LEVEL 2 BASE: Performed by: NEUROLOGICAL SURGERY

## 2022-07-27 PROCEDURE — 7100000011 HC PHASE II RECOVERY - ADDTL 15 MIN: Performed by: NEUROLOGICAL SURGERY

## 2022-07-27 PROCEDURE — 2500000003 HC RX 250 WO HCPCS: Performed by: NEUROLOGICAL SURGERY

## 2022-07-27 PROCEDURE — 3700000000 HC ANESTHESIA ATTENDED CARE: Performed by: NEUROLOGICAL SURGERY

## 2022-07-27 PROCEDURE — 7100000001 HC PACU RECOVERY - ADDTL 15 MIN: Performed by: NEUROLOGICAL SURGERY

## 2022-07-27 PROCEDURE — 6370000000 HC RX 637 (ALT 250 FOR IP): Performed by: ANESTHESIOLOGY

## 2022-07-27 PROCEDURE — 82962 GLUCOSE BLOOD TEST: CPT

## 2022-07-27 PROCEDURE — 2580000003 HC RX 258: Performed by: ANESTHESIOLOGY

## 2022-07-27 PROCEDURE — 6370000000 HC RX 637 (ALT 250 FOR IP): Performed by: NEUROLOGICAL SURGERY

## 2022-07-27 PROCEDURE — 3600000012 HC SURGERY LEVEL 2 ADDTL 15MIN: Performed by: NEUROLOGICAL SURGERY

## 2022-07-27 PROCEDURE — 7100000000 HC PACU RECOVERY - FIRST 15 MIN: Performed by: NEUROLOGICAL SURGERY

## 2022-07-27 PROCEDURE — 87075 CULTR BACTERIA EXCEPT BLOOD: CPT

## 2022-07-27 PROCEDURE — 2500000003 HC RX 250 WO HCPCS: Performed by: NURSE ANESTHETIST, CERTIFIED REGISTERED

## 2022-07-27 PROCEDURE — A4217 STERILE WATER/SALINE, 500 ML: HCPCS | Performed by: NEUROLOGICAL SURGERY

## 2022-07-27 PROCEDURE — 2580000003 HC RX 258: Performed by: NEUROLOGICAL SURGERY

## 2022-07-27 PROCEDURE — 6360000002 HC RX W HCPCS: Performed by: NURSE ANESTHETIST, CERTIFIED REGISTERED

## 2022-07-27 PROCEDURE — 87205 SMEAR GRAM STAIN: CPT

## 2022-07-27 PROCEDURE — 6360000002 HC RX W HCPCS: Performed by: ANESTHESIOLOGY

## 2022-07-27 PROCEDURE — 63662 REMOVE SPINE ELTRD PLATE: CPT | Performed by: NEUROLOGICAL SURGERY

## 2022-07-27 RX ORDER — LIDOCAINE HYDROCHLORIDE 10 MG/ML
1 INJECTION, SOLUTION INFILTRATION; PERINEURAL
Status: DISCONTINUED | OUTPATIENT
Start: 2022-07-27 | End: 2022-07-27 | Stop reason: HOSPADM

## 2022-07-27 RX ORDER — OXYCODONE HYDROCHLORIDE 5 MG/1
5 TABLET ORAL
Status: DISCONTINUED | OUTPATIENT
Start: 2022-07-27 | End: 2022-07-27 | Stop reason: HOSPADM

## 2022-07-27 RX ORDER — ROCURONIUM BROMIDE 10 MG/ML
INJECTION, SOLUTION INTRAVENOUS PRN
Status: DISCONTINUED | OUTPATIENT
Start: 2022-07-27 | End: 2022-07-27 | Stop reason: SDUPTHER

## 2022-07-27 RX ORDER — DIPHENHYDRAMINE HYDROCHLORIDE 50 MG/ML
12.5 INJECTION INTRAMUSCULAR; INTRAVENOUS
Status: DISCONTINUED | OUTPATIENT
Start: 2022-07-27 | End: 2022-07-27 | Stop reason: HOSPADM

## 2022-07-27 RX ORDER — SODIUM CHLORIDE 9 MG/ML
INJECTION, SOLUTION INTRAVENOUS PRN
Status: DISCONTINUED | OUTPATIENT
Start: 2022-07-27 | End: 2022-07-27 | Stop reason: HOSPADM

## 2022-07-27 RX ORDER — LIDOCAINE HYDROCHLORIDE AND EPINEPHRINE 10; 10 MG/ML; UG/ML
INJECTION, SOLUTION INFILTRATION; PERINEURAL PRN
Status: DISCONTINUED | OUTPATIENT
Start: 2022-07-27 | End: 2022-07-27 | Stop reason: HOSPADM

## 2022-07-27 RX ORDER — FENTANYL CITRATE 50 UG/ML
100 INJECTION, SOLUTION INTRAMUSCULAR; INTRAVENOUS
Status: DISCONTINUED | OUTPATIENT
Start: 2022-07-27 | End: 2022-07-27 | Stop reason: HOSPADM

## 2022-07-27 RX ORDER — SODIUM CHLORIDE, SODIUM LACTATE, POTASSIUM CHLORIDE, CALCIUM CHLORIDE 600; 310; 30; 20 MG/100ML; MG/100ML; MG/100ML; MG/100ML
INJECTION, SOLUTION INTRAVENOUS CONTINUOUS
Status: DISCONTINUED | OUTPATIENT
Start: 2022-07-27 | End: 2022-07-27 | Stop reason: HOSPADM

## 2022-07-27 RX ORDER — CEPHALEXIN 500 MG/1
500 CAPSULE ORAL 4 TIMES DAILY
Qty: 40 CAPSULE | Refills: 0 | Status: SHIPPED | OUTPATIENT
Start: 2022-07-27 | End: 2022-08-06

## 2022-07-27 RX ORDER — OXYCODONE AND ACETAMINOPHEN 7.5; 325 MG/1; MG/1
1 TABLET ORAL EVERY 8 HOURS PRN
Qty: 15 TABLET | Refills: 0 | Status: SHIPPED | OUTPATIENT
Start: 2022-07-27 | End: 2022-08-01

## 2022-07-27 RX ORDER — LIDOCAINE HYDROCHLORIDE 20 MG/ML
INJECTION, SOLUTION EPIDURAL; INFILTRATION; INTRACAUDAL; PERINEURAL PRN
Status: DISCONTINUED | OUTPATIENT
Start: 2022-07-27 | End: 2022-07-27 | Stop reason: SDUPTHER

## 2022-07-27 RX ORDER — FENTANYL CITRATE 50 UG/ML
INJECTION, SOLUTION INTRAMUSCULAR; INTRAVENOUS PRN
Status: DISCONTINUED | OUTPATIENT
Start: 2022-07-27 | End: 2022-07-27 | Stop reason: SDUPTHER

## 2022-07-27 RX ORDER — HYDROMORPHONE HYDROCHLORIDE 2 MG/ML
0.5 INJECTION, SOLUTION INTRAMUSCULAR; INTRAVENOUS; SUBCUTANEOUS EVERY 5 MIN PRN
Status: DISCONTINUED | OUTPATIENT
Start: 2022-07-27 | End: 2022-07-27 | Stop reason: HOSPADM

## 2022-07-27 RX ORDER — ESMOLOL HYDROCHLORIDE 10 MG/ML
INJECTION INTRAVENOUS PRN
Status: DISCONTINUED | OUTPATIENT
Start: 2022-07-27 | End: 2022-07-27 | Stop reason: SDUPTHER

## 2022-07-27 RX ORDER — PROPOFOL 10 MG/ML
INJECTION, EMULSION INTRAVENOUS PRN
Status: DISCONTINUED | OUTPATIENT
Start: 2022-07-27 | End: 2022-07-27 | Stop reason: SDUPTHER

## 2022-07-27 RX ORDER — ONDANSETRON 2 MG/ML
INJECTION INTRAMUSCULAR; INTRAVENOUS PRN
Status: DISCONTINUED | OUTPATIENT
Start: 2022-07-27 | End: 2022-07-27 | Stop reason: SDUPTHER

## 2022-07-27 RX ORDER — EPHEDRINE SULFATE/0.9% NACL/PF 50 MG/5 ML
SYRINGE (ML) INTRAVENOUS PRN
Status: DISCONTINUED | OUTPATIENT
Start: 2022-07-27 | End: 2022-07-27 | Stop reason: SDUPTHER

## 2022-07-27 RX ORDER — DEXAMETHASONE SODIUM PHOSPHATE 4 MG/ML
INJECTION, SOLUTION INTRA-ARTICULAR; INTRALESIONAL; INTRAMUSCULAR; INTRAVENOUS; SOFT TISSUE PRN
Status: DISCONTINUED | OUTPATIENT
Start: 2022-07-27 | End: 2022-07-27 | Stop reason: SDUPTHER

## 2022-07-27 RX ORDER — NEOSTIGMINE METHYLSULFATE 1 MG/ML
INJECTION, SOLUTION INTRAVENOUS PRN
Status: DISCONTINUED | OUTPATIENT
Start: 2022-07-27 | End: 2022-07-27 | Stop reason: SDUPTHER

## 2022-07-27 RX ORDER — HYDROMORPHONE HYDROCHLORIDE 2 MG/ML
0.25 INJECTION, SOLUTION INTRAMUSCULAR; INTRAVENOUS; SUBCUTANEOUS EVERY 5 MIN PRN
Status: DISCONTINUED | OUTPATIENT
Start: 2022-07-27 | End: 2022-07-27 | Stop reason: HOSPADM

## 2022-07-27 RX ORDER — SODIUM CHLORIDE 0.9 % (FLUSH) 0.9 %
5-40 SYRINGE (ML) INJECTION PRN
Status: DISCONTINUED | OUTPATIENT
Start: 2022-07-27 | End: 2022-07-27 | Stop reason: HOSPADM

## 2022-07-27 RX ORDER — GLYCOPYRROLATE 0.2 MG/ML
INJECTION INTRAMUSCULAR; INTRAVENOUS PRN
Status: DISCONTINUED | OUTPATIENT
Start: 2022-07-27 | End: 2022-07-27 | Stop reason: SDUPTHER

## 2022-07-27 RX ORDER — SODIUM CHLORIDE 0.9 % (FLUSH) 0.9 %
5-40 SYRINGE (ML) INJECTION EVERY 12 HOURS SCHEDULED
Status: DISCONTINUED | OUTPATIENT
Start: 2022-07-27 | End: 2022-07-27 | Stop reason: HOSPADM

## 2022-07-27 RX ORDER — MIDAZOLAM HYDROCHLORIDE 1 MG/ML
INJECTION INTRAMUSCULAR; INTRAVENOUS PRN
Status: DISCONTINUED | OUTPATIENT
Start: 2022-07-27 | End: 2022-07-27 | Stop reason: SDUPTHER

## 2022-07-27 RX ORDER — PROCHLORPERAZINE EDISYLATE 5 MG/ML
5 INJECTION INTRAMUSCULAR; INTRAVENOUS
Status: COMPLETED | OUTPATIENT
Start: 2022-07-27 | End: 2022-07-27

## 2022-07-27 RX ORDER — FAMOTIDINE 20 MG/1
20 TABLET, FILM COATED ORAL ONCE
Status: COMPLETED | OUTPATIENT
Start: 2022-07-27 | End: 2022-07-27

## 2022-07-27 RX ORDER — MIDAZOLAM HYDROCHLORIDE 2 MG/2ML
2 INJECTION, SOLUTION INTRAMUSCULAR; INTRAVENOUS
Status: DISCONTINUED | OUTPATIENT
Start: 2022-07-27 | End: 2022-07-27 | Stop reason: HOSPADM

## 2022-07-27 RX ADMIN — HYDROMORPHONE HYDROCHLORIDE 0.5 MG: 2 INJECTION, SOLUTION INTRAMUSCULAR; INTRAVENOUS; SUBCUTANEOUS at 11:06

## 2022-07-27 RX ADMIN — ROCURONIUM BROMIDE 40 MG: 50 INJECTION, SOLUTION INTRAVENOUS at 09:13

## 2022-07-27 RX ADMIN — Medication 5 MG: at 09:56

## 2022-07-27 RX ADMIN — PROPOFOL 20 MG: 10 INJECTION, EMULSION INTRAVENOUS at 09:57

## 2022-07-27 RX ADMIN — Medication 3 AMPULE: at 08:46

## 2022-07-27 RX ADMIN — Medication 5 MG: at 10:14

## 2022-07-27 RX ADMIN — PROCHLORPERAZINE EDISYLATE 5 MG: 5 INJECTION INTRAMUSCULAR; INTRAVENOUS at 10:50

## 2022-07-27 RX ADMIN — DEXAMETHASONE SODIUM PHOSPHATE 4 MG: 4 INJECTION, SOLUTION INTRAMUSCULAR; INTRAVENOUS at 09:28

## 2022-07-27 RX ADMIN — MIDAZOLAM 2 MG: 1 INJECTION INTRAMUSCULAR; INTRAVENOUS at 09:11

## 2022-07-27 RX ADMIN — GLYCOPYRROLATE 0.6 MG: 0.2 INJECTION, SOLUTION INTRAMUSCULAR; INTRAVENOUS at 09:56

## 2022-07-27 RX ADMIN — FENTANYL CITRATE 25 MCG: 50 INJECTION, SOLUTION INTRAMUSCULAR; INTRAVENOUS at 09:57

## 2022-07-27 RX ADMIN — LIDOCAINE HYDROCHLORIDE 100 MG: 20 INJECTION, SOLUTION EPIDURAL; INFILTRATION; INTRACAUDAL; PERINEURAL at 09:13

## 2022-07-27 RX ADMIN — PROPOFOL 200 MG: 10 INJECTION, EMULSION INTRAVENOUS at 09:13

## 2022-07-27 RX ADMIN — FENTANYL CITRATE 50 MCG: 50 INJECTION, SOLUTION INTRAMUSCULAR; INTRAVENOUS at 09:13

## 2022-07-27 RX ADMIN — FENTANYL CITRATE 25 MCG: 50 INJECTION, SOLUTION INTRAMUSCULAR; INTRAVENOUS at 10:05

## 2022-07-27 RX ADMIN — FAMOTIDINE 20 MG: 20 TABLET, FILM COATED ORAL at 08:46

## 2022-07-27 RX ADMIN — PROPOFOL 20 MG: 10 INJECTION, EMULSION INTRAVENOUS at 10:18

## 2022-07-27 RX ADMIN — ESMOLOL HYDROCHLORIDE 20 MG: 10 INJECTION, SOLUTION INTRAVENOUS at 09:59

## 2022-07-27 RX ADMIN — VANCOMYCIN HYDROCHLORIDE 1500 MG: 10 INJECTION, POWDER, LYOPHILIZED, FOR SOLUTION INTRAVENOUS at 08:59

## 2022-07-27 RX ADMIN — Medication 10 MG: at 09:26

## 2022-07-27 RX ADMIN — SODIUM CHLORIDE, POTASSIUM CHLORIDE, SODIUM LACTATE AND CALCIUM CHLORIDE: 600; 310; 30; 20 INJECTION, SOLUTION INTRAVENOUS at 08:54

## 2022-07-27 RX ADMIN — ONDANSETRON 4 MG: 2 INJECTION INTRAMUSCULAR; INTRAVENOUS at 09:28

## 2022-07-27 ASSESSMENT — PAIN DESCRIPTION - LOCATION: LOCATION: BACK

## 2022-07-27 ASSESSMENT — PAIN DESCRIPTION - DESCRIPTORS: DESCRIPTORS: ACHING

## 2022-07-27 ASSESSMENT — PAIN - FUNCTIONAL ASSESSMENT: PAIN_FUNCTIONAL_ASSESSMENT: 0-10

## 2022-07-27 ASSESSMENT — PAIN SCALES - GENERAL: PAINLEVEL_OUTOF10: 7

## 2022-07-27 NOTE — DISCHARGE INSTRUCTIONS
Spinal Cord Stimulator Removal Post op Instructions    1.  2-5 pound weight limit, no bending, twisting, over-head lifting, ladder climbing or sitting more than 30- 45 minutes. May do steps and inclines; please avoid abrupt sudden changes- strict restrictions until you go to your follow-up appointment. 2.  Remove dressing in 3-5. Do not touch the underlying dressing which may be steri strips or skin glue. Wash incisions everyday with an antibacterial soap after removing dressing- showers only, no tub bath, pool, hot tub; do not put anything on the incisions. 3.  Observe for signs of infection- fever> 101, drainage, increase of swelling, headache, redness or tenderness. 4.  May drive when you are able to do the movements associated with driving and are no longer taking narcotic pain pills. 5.  No sleeping on your stomach- may sleep on side or on your back. 6.  Walk as much as you want. 7.  Do expect surgical pain especially at incisional sites- especially thoracic pain that might radiate to the chest wall/ rib area. 9. Maintain a good diet. [place] foods today, nothing spicy or greasy. 10. If you have a back binder, you may wear this for 6 weeks, but it is not necessary     11. At your follow-up appointment ask when you can resume your normal activity. Medication Interaction:     During your procedure you potentially received a medication or medications which may reduce the effectiveness of oral contraceptives. Please consider other forms of contraception for 1 month following your procedure if you are currently using oral contraceptives as your primary form of birth control. In addition to this, we recommend continuing your oral contraceptive as prescribed, unless otherwise instructed by your physician, during this time.     After general anesthesia or intravenous sedation, for 24 hours or while taking prescription Narcotics:  Limit your activities  A responsible adult needs to be with you for the next 24 hours  Do not drive and operate hazardous machinery  Do not make important personal or business decisions  Do not drink alcoholic beverages  If you have not urinated within 8 hours after discharge, and you are experiencing discomfort from urinary retention, please go to the nearest ED. If you have sleep apnea and have a CPAP machine, please use it for all naps and sleeping. Please use caution when taking narcotics and any of your home medications that may cause drowsiness. *  Please give a list of your current medications to your Primary Care Provider. *  Please update this list whenever your medications are discontinued, doses are      changed, or new medications (including over-the-counter products) are added. *  Please carry medication information at all times in case of emergency situations. These are general instructions for a healthy lifestyle:  No smoking/ No tobacco products/ Avoid exposure to second hand smoke  Surgeon General's Warning:  Quitting smoking now greatly reduces serious risk to your health. Obesity, smoking, and sedentary lifestyle greatly increases your risk for illness  A healthy diet, regular physical exercise & weight monitoring are important for maintaining a healthy lifestyle    You may be retaining fluid if you have a history of heart failure or if you experience any of the following symptoms:  Weight gain of 3 pounds or more overnight or 5 pounds in a week, increased swelling in our hands or feet or shortness of breath while lying flat in bed. Please call your doctor as soon as you notice any of these symptoms; do not wait until your next office visit.

## 2022-07-27 NOTE — ANESTHESIA PROCEDURE NOTES
Airway  Date/Time: 7/27/2022 9:16 AM  Urgency: elective    Airway not difficult    General Information and Staff    Patient location during procedure: OR  Resident/CRNA: KEDAR Sarmiento CRNA  Performed: resident/CRNA     Indications and Patient Condition  Indications for airway management: anesthesia  Spontaneous Ventilation: absent  Sedation level: deep  Preoxygenated: yes  Patient position: sniffing  MILS not maintained throughout  Mask difficulty assessment: vent by bag mask    Final Airway Details  Final airway type: endotracheal airway      Successful airway: ETT  Cuffed: yes   Successful intubation technique: video laryngoscopy  Facilitating devices/methods: intubating stylet  Endotracheal tube insertion site: oral  Blade size: #4  ETT size (mm): 7.0  Cormack-Lehane Classification: grade I - full view of glottis  Placement verified by: chest auscultation and capnometry   Measured from: lips  Number of attempts at approach: 1  Ventilation between attempts: bag mask    Additional Comments  Elective glidescope due to hardware in neck, braces and upper partial   no

## 2022-07-27 NOTE — BRIEF OP NOTE
Brief Postoperative Note      Patient: Era Esteves  YOB: 1965  MRN: [id number]    Date of Procedure: 7/27/2022    Pre-Op Diagnosis: Wound drainage [L24. A9]  Status post insertion of neuropacemaker [Z96.89]  Chronic pain syndrome [G89.4]    Post-Op Diagnosis: Same       Procedure(s):  SCS REMOVAL    Surgeon(s):  Juan Lora MD    Assistant:  * No surgical staff found *    Anesthesia: General    Estimated Blood Loss (mL): Minimal    Complications: None    Specimens:   ID Type Source Tests Collected by Time Destination   1 : thorasic incision  Swab Spine CULTURE, WOUND, CULTURE, ANAEROBIC AND AEROBIC Juan Lora MD 7/27/2022 2764        Implants:  Implant Name Type Inv.  Item Serial No.  Lot No. LRB No. Used Action   GENERATOR NEUROSTIMULATOR [id number] - [id number]  GENERATOR NEUROSTIMULATOR 304CUCM [id number] ABBOTT-[id number]  N/A 1 Explanted   LEAD NEUROSTIMULATOR L60CM MR CONDITIONAL PENTA - [id number]  LEAD NEUROSTIMULATOR L60CM MR CONDITIONAL PENTA [id number] [employer] MED NEUOMODULATION DIV-WD  N/A 1 Explanted         Drains: * No LDAs found *    Findings: LIQUIFIED HEMATOMA    Electronically signed by Morgan Reaves MD on 7/27/2022 at 10:09 AM

## 2022-07-27 NOTE — ANESTHESIA POSTPROCEDURE EVALUATION
Department of Anesthesiology  Postprocedure Note    Patient: Omar Hoyt  MRN: [id number]  YOB: 1965  Date of evaluation: 7/27/2022      Procedure Summary     Date: 07/27/22 Room / Location: D MAIN OR 10 / [facility] MAIN OR    Anesthesia Start: 0906 Anesthesia Stop: 6172    Procedure: SCS REMOVAL (Spine Thoracic) Diagnosis:       Wound drainage      Status post insertion of neuropacemaker      Chronic pain syndrome      (Wound drainage [L24. A9])      (Status post insertion of neuropacemaker [Z96.89])      (Chronic pain syndrome [G89.4])    Providers: Cristina Shipman MD Responsible Provider: Chaparro Carrasco MD    Anesthesia Type: general ASA Status: 2          Anesthesia Type: No value filed.     Sherly Phase I: Sherly Score: 8    Sherly Phase II: Sherly Score: 10      Anesthesia Post Evaluation    Patient location during evaluation: PACU  Patient participation: complete - patient participated  Level of consciousness: awake and alert  Airway patency: patent  Nausea & Vomiting: no nausea and no vomiting  Complications: no  Cardiovascular status: hemodynamically stable  Respiratory status: acceptable, nonlabored ventilation and spontaneous ventilation  Hydration status: euvolemic  Comments: /61   Pulse 75   Temp 97.4 °F (36.3 °C) (Skin)   Resp 18   Ht 5' 3\" (1.6 m)   Wt 201 lb 6 oz (91.3 kg)   SpO2 96%   BMI 35.67 kg/m²     Multimodal analgesia pain management approach

## 2022-07-27 NOTE — TELEPHONE ENCOUNTER
Spoke to [facility] at [address]- clarified oxycodone script is for 5 days not 30 days

## 2022-07-27 NOTE — OP NOTE
[facility]  OPERATIVE REPORT    Name:  Walter Salgado  MR#:  [id number]  :  1965  ACCOUNT #:  [de-identified]  DATE OF SERVICE:  2022    PREOPERATIVE DIAGNOSIS:  Allergic reaction to implanted spinal cord stimulator. POSTOPERATIVE DIAGNOSIS:  Allergic reaction to implanted spinal cord stimulator. PROCEDURES PERFORMED:  1. Removal of spinal cord stimulator electrode paddle. 2.  Removal of spinal cord stimulator pulse generator. 3.  Continuous intraoperative fluoroscopy. SURGEON:  Cristina Shipman MD    ASSISTANT:  None. ANESTHESIA:  General endotracheal.    COMPLICATIONS:  None. SPECIMENS REMOVED:  None. IMPLANTS:  None. ESTIMATED BLOOD LOSS:  Minimal.    PREPARATION:  ChloraPrep. HISTORY OF PRESENT ILLNESS:  A 49-year-old lady with chronic pain syndrome and a successful spinal cord stimulator trial who underwent an uncomplicated spinal cord stimulator placement 12 days ago. Postoperatively, she developed an unusual reaction that she felt was related to the implanted device. She described chills, shakes, headaches, blurred vision, worsening pain, pruritus, and minimal drainage. She contacted the office several times and asked that we schedule her for surgery to have it removed. I had a long conversation with her to try to encourage her to stay the course but she was adamant that the device was causing her an unacceptable level of allergic reaction, and therefore, she is taken to the operating room today for removal.    PROCEDURE:  The patient was brought to the operating room, carefully placed under general endotracheal anesthesia without complications, carefully turned prone on the OSI bed on top of the [address] frame. Both incisions were prepped and draped in usual sterile fashion.   A 1% Xylocaine with epinephrine was used to infiltrate the skin in both incisions which was incised with a 15-blade, first in the generator pocket and carried down to the wire and generators which were visible. Using an arterial hemostat, the generator was pulled up out of the pocket without complications. Liquefied hematoma was present. The wires were cut. Next, the thoracic incision was developed with a 15-blade and carried down to the straight connector which was identified and the suture was cut. Straight connectors were removed and the electrode paddle was removed in toto with all the electrodes in place and no retained wire or hardware was seen. A culture was obtained from the thoracic incision although no obvious purulent material was present. Liquified hematoma was present. The culture was sent for Gram stain, cultures and sensitivity. Both wounds were copiously irrigated with antibiotic irrigation and closed. The fascia was closed tightly with interrupted 0 Vicryl. Subcutaneous tissue was closed with interrupted 3-0 Vicryl. Skin was closed with small staples and pressure dressings were placed. The patient tolerated the procedure well, was turned supine, awakened, extubated, and taken to PACU in stable condition. There were no obvious complications.       Юлия Parson MD      [id number]  D:  07/27/2022 10:19  T:  07/27/2022 18:03  JOB #:  [id number]

## 2022-07-27 NOTE — ANESTHESIA PRE PROCEDURE
Department of Anesthesiology  Preprocedure Note       Name:  Julien Regan   Age:  60 y.o.  :  1965                                          MRN:  [id number]         Date:  2022      Surgeon: Lord Bell):  Jany Ott MD    Procedure: Procedure(s):  SCS REMOVAL    Medications prior to admission:   Prior to Admission medications    Medication Sig Start Date End Date Taking? Authorizing Provider   metFORMIN (GLUCOPHAGE-XR) 500 MG extended release tablet Take 500 mg by mouth daily (with breakfast) 2022 holding for 2 weeks, may be causing nausea  Patient not taking: No sig reported    Historical Provider, MD   desvenlafaxine succinate (PRISTIQ) 25 MG TB24 extended release tablet Take 1 tablet by mouth at bedtime 22   Historical Provider, MD   ondansetron (ZOFRAN) 4 MG tablet Take 1 tablet by mouth every 8 hours as needed for Nausea or Vomiting 22   Jany Ott MD   sulfamethoxazole-trimethoprim (BACTRIM DS;SEPTRA DS) 800-160 MG per tablet Take 1 tablet by mouth in the morning and 1 tablet before bedtime. Do all this for 10 days. 22  Rory Mcgraw MD   amLODIPine (NORVASC) 5 MG tablet 10 mg daily 22   Historical Provider, MD   chlorthalidone (HYGROTON) 25 MG tablet daily 5/3/22   Historical Provider, MD   diclofenac sodium (VOLTAREN) 1 % GEL Apply 1-2 g topically as needed 5/3/22   Historical Provider, MD   ipratropium (ATROVENT) 0.06 % nasal spray as needed 11/3/21   Historical Provider, MD   ketoconazole (NIZORAL) 2 % shampoo as needed 3/7/22   Historical Provider, MD   linaCLOtide Jim Eaton) 72 MCG CAPS capsule every other day    Historical Provider, MD   nystatin (MYCOSTATIN) 599077 UNIT/GM powder as needed 22   Historical Provider, MD   sucralfate (CARAFATE) 1 GM tablet 3 times daily as needed    Historical Provider, MD   valsartan (DIOVAN) 80 MG tablet Take 80 mg by mouth in the morning.  22   Historical Provider, MD   busPIRone (BUSPAR) 15 MG tablet Take 10 mg by mouth in the morning and at bedtime 9/19/18   Ar Automatic Reconciliation   clonazePAM (KLONOPIN) 0.5 MG tablet TAKE ONE TABLET BY MOUTH TWICE A DAY AS NEEDED FOR ANXIETY 1/10/22   Ar Automatic Reconciliation   clotrimazole-betamethasone (LOTRISONE) 1-0.05 % cream Apply topically 2 times daily as needed  2/7/19   Ar Automatic Reconciliation   cyclobenzaprine (FLEXERIL) 10 MG tablet Take 10 mg by mouth 3 times daily as needed  10/19/21   Ar Automatic Reconciliation   dicyclomine (BENTYL) 10 MG capsule TAKE ONE CAPSULE BY MOUTH FOUR TIMES A DAY AS NEEDED 12/7/21   Ar Automatic Reconciliation   esomeprazole (NEXIUM) 40 MG delayed release capsule Take 40 mg by mouth daily    Ar Automatic Reconciliation   gabapentin (NEURONTIN) 400 MG capsule Take 400 mg by mouth 3 times daily as needed. Ar Automatic Reconciliation   Probiotic Product (ACIDOPHILUS PROBIOTIC) CAPS capsule Take by mouth daily  Patient not taking: No sig reported    Ar Automatic Reconciliation   rizatriptan (MAXALT-MLT) 10 MG disintegrating tablet Place 10 mg under the tongue as needed  7/2/20   Ar Automatic Reconciliation   valACYclovir (VALTREX) 1 g tablet Take 1,000 mg by mouth as needed    Ar Automatic Reconciliation       Current medications:    No current facility-administered medications for this visit. No current outpatient medications on file.      Facility-Administered Medications Ordered in Other Visits   Medication Dose Route Frequency Provider Last Rate Last Admin    sodium chloride flush 0.9 % injection 5-40 mL  5-40 mL IntraVENous 2 times per day Low Rios MD        sodium chloride flush 0.9 % injection 5-40 mL  5-40 mL IntraVENous PRN Low Rios MD        0.9 % sodium chloride infusion   IntraVENous PRN Low Rios MD        vancomycin (VANCOCIN) 1500 mg in sodium chloride 0.9% 500 mL IVPB  1,500 mg IntraVENous On Call to Dse Taylor  mL/hr at 07/27/22 0859 1,500 mg at 07/27/22 0859    lidocaine 1 % injection 1 mL  1 mL IntraDERmal Once PRN Luh Caldera MD        fentaNYL (SUBLIMAZE) injection 100 mcg  100 mcg IntraVENous Once PRN Luh Caldera MD        midazolam PF (VERSED) injection 2 mg  2 mg IntraVENous Once PRN Luh Caldera MD        lactated ringers infusion   IntraVENous Continuous Luh Caldera  mL/hr at 07/27/22 0854 New Bag at 07/27/22 0854       Allergies: Allergies   Allergen Reactions    Doxycycline Itching    Lisinopril Itching    Penicillins Rash       Problem List:    Patient Active Problem List   Diagnosis Code    Rash of genitalia R21    Skin lesion of left arm L98.9    Chronic intractable headache R51.9, G89.29    Menstrual bleeding problem N93.9    Lipoma of thigh D17.20    Urinary tract infection without hematuria N39.0    DDD (degenerative disc disease), cervical M50.30    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 9) E66.9    Neuropathy G62.9    Wound drainage L24. A9    Status post insertion of neuropacemaker Z96.89       Past Medical History:        Diagnosis Date    Anxiety and depression     medication    Chronic pain     Diabetes mellitus ([facility] [address]     \"borderline\" oral agents, does not check BS; started taking medications 7/7/22, A1c 5.9 5/3/22    Elevated liver enzymes 05/17/2022 5/17/22 states at PCP now to discuss- AST 39, ALT 53    Elevated liver enzymes 07/07/2022    Former smoker, stopped smoking in distant past     quit 2016  0.5 ppd x 32 years    GERD (gastroesophageal reflux disease)     nexium daily, 2-3 pillows qhs as needed, hiatal hernia    Hiatal hernia     History of kidney stones     last one     Hypertension     medication    Migraines     controlled with med    Neuropathy     PONV (postoperative nausea and vomiting)     medication relieves; \"sensitive stomach\" request IV medications    Psychiatric disorder     aniexty    Restless legs syndrome (RLS)        Past Surgical History:        Procedure Laterality Date    ACHILLES TENDON SURGERY      surgery    AXILLARY SURGERY  2013    mass excision    CERVICAL FUSION      ACDF C3-C4 and previous neck surgery    CHOLECYSTECTOMY, LAPAROSCOPIC      COLONOSCOPY      ENDOMETRIAL ABLATION  15 yrs ago    Ablation    ENDOSCOPY, COLON, DIAGNOSTIC      LUMBAR LAMINECTOMY      L4-L5 herniated disk    NOSE SURGERY      4 surgies to repair nose    PERONEAL TENDON EXPLORATION Right 2022    STIMULATOR SURGERY N/A 07/15/2022    STIMULATOR INSERTION-Spinal Cord Stimulator Placement performed by Jany Ott MD at [facility]   age 25   [facility]      cysto        Social History:    Social History     Tobacco Use    Smoking status: Former     Packs/day: 0.50     Types: Cigarettes     Quit date: 3/17/2016     Years since quittin.3    Smokeless tobacco: Never   Substance Use Topics    Alcohol use: No                                Counseling given: Not Answered      Vital Signs (Current): There were no vitals filed for this visit.                                            BP Readings from Last 3 Encounters:   22 (!) 153/76   22 (!) 149/92   22 (!) 114/59       NPO Status:                                                                                 BMI:   Wt Readings from Last 3 Encounters:   22 201 lb 6 oz (91.3 kg)   22 201 lb (91.2 kg)   22 204 lb (92.5 kg)     There is no height or weight on file to calculate BMI.    CBC:   Lab Results   Component Value Date/Time    WBC 7.1 2022 10:00 AM    RBC 4.40 07/08/2022 10:00 AM    HGB 10.7 07/08/2022 10:00 AM    HCT 35.2 07/08/2022 10:00 AM    MCV 80.0 07/08/2022 10:00 AM    RDW 14.6 07/08/2022 10:00 AM     07/08/2022 10:00 AM       CMP:   Lab Results   Component Value Date/Time     07/08/2022 10:00 AM    K 3.5 07/08/2022 10:00 AM     07/08/2022 10:00 AM    CO2 30 07/08/2022 10:00 AM    BUN 19 07/08/2022 10:00 AM    CREATININE 1.00 07/16/2022 03:32 AM    GFRAA >60 07/08/2022 10:00 AM    AGRATIO 0.7 12/30/2019 12:07 PM    LABGLOM 57 07/08/2022 10:00 AM    GLUCOSE 80 07/08/2022 10:00 AM    PROT 7.8 12/30/2019 12:07 PM    CALCIUM 9.6 07/08/2022 10:00 AM    BILITOT 0.7 12/30/2019 12:07 PM    ALKPHOS 78 12/30/2019 12:07 PM    AST 83 12/30/2019 12:07 PM     12/30/2019 12:07 PM       POC Tests:   No results for input(s): POCGLU, POCNA, POCK, POCCL, POCBUN, POCHEMO, POCHCT in the last 72 hours. Coags: No results found for: PROTIME, INR, APTT    HCG (If Applicable): No results found for: PREGTESTUR, PREGSERUM, HCG, HCGQUANT     ABGs: No results found for: PHART, PO2ART, ESE2BJM, NEV7UWU, BEART, Q0BPWMAX     Type & Screen (If Applicable):  No results found for: LABABO, LABRH    Drug/Infectious Status (If Applicable):  No results found for: HIV, HEPCAB    COVID-19 Screening (If Applicable): No results found for: COVID19        Anesthesia Evaluation  Patient summary reviewed and Nursing notes reviewed   history of anesthetic complications: PONV.   Airway: Mallampati: II  TM distance: >3 FB   Neck ROM: full     Dental:    (+) lower braces and bridge      Pulmonary:Negative Pulmonary ROS and normal exam                               Cardiovascular:  Exercise tolerance: good (>4 METS),   (+) hypertension: no interval change,         Rhythm: regular  Rate: normal                    Neuro/Psych:   Negative Neuro/Psych ROS              GI/Hepatic/Renal: Neg GI/Hepatic/Renal ROS  (+) GERD: well controlled,           Endo/Other: Negative Endo/Other ROS   (+) DiabetesType II DM, , .          Pt had no PAT visit       Abdominal:             Vascular: negative vascular ROS. Other Findings:             Anesthesia Plan      general     ASA 2       Induction: intravenous. Anesthetic plan and risks discussed with patient.       Plan discussed with surgical team.                    Raina Sung MD   7/27/2022

## 2022-07-29 LAB
BACTERIA SPEC CULT: NORMAL
GRAM STN SPEC: NORMAL
GRAM STN SPEC: NORMAL
SERVICE CMNT-IMP: NORMAL

## 2022-08-03 LAB
BACTERIA SPEC CULT: NORMAL
SERVICE CMNT-IMP: NORMAL

## 2022-08-11 ENCOUNTER — NURSE ONLY (OUTPATIENT)
Dept: NEUROSURGERY | Age: 57
End: 2022-08-11

## 2022-08-11 ENCOUNTER — TELEPHONE (OUTPATIENT)
Dept: NEUROSURGERY | Age: 57
End: 2022-08-11

## 2022-08-11 VITALS
WEIGHT: 208.13 LBS | TEMPERATURE: 98.3 F | HEART RATE: 98 BPM | DIASTOLIC BLOOD PRESSURE: 80 MMHG | BODY MASS INDEX: 36.88 KG/M2 | SYSTOLIC BLOOD PRESSURE: 128 MMHG | OXYGEN SATURATION: 97 % | HEIGHT: 63 IN

## 2022-08-11 DIAGNOSIS — Z96.89 STATUS POST INSERTION OF SPINAL CORD STIMULATOR: ICD-10-CM

## 2022-08-11 DIAGNOSIS — G89.4 CHRONIC PAIN SYNDROME: ICD-10-CM

## 2022-08-11 DIAGNOSIS — L24.A9 WOUND DRAINAGE: Primary | ICD-10-CM

## 2022-08-11 RX ORDER — CIPROFLOXACIN 500 MG/1
500 TABLET, FILM COATED ORAL 2 TIMES DAILY
Qty: 20 TABLET | Refills: 0 | Status: SHIPPED | OUTPATIENT
Start: 2022-08-11 | End: 2022-08-21

## 2022-08-11 RX ORDER — TIZANIDINE 4 MG/1
4 TABLET ORAL 3 TIMES DAILY PRN
Qty: 30 TABLET | Refills: 1 | Status: SHIPPED | OUTPATIENT
Start: 2022-08-11

## 2022-08-11 NOTE — TELEPHONE ENCOUNTER
Pharmacy state Torres and Tizanidine are contraindicated. I spoke with Dr. Brittney Lockett he stated to only fill Cipro.  Pharmacy informed

## 2022-08-11 NOTE — PROGRESS NOTES
Subjective: Patient states she feels much improved except for new left side posterior leg sciatica pain. She states the upper incision has been draining since after surgery    Consent: verbal consent obtained and given by the patient. Risks of removal include bleeding,infection,reopening and excessive scarring. Objective:  /80 (Site: Left Upper Arm)   Pulse 98   Temp 98.3 °F (36.8 °C) (Temporal)   Ht 5' 3\" (1.6 m)   Wt 208 lb 2 oz (94.4 kg)   SpO2 97%   BMI 36.87 kg/m²   Pain Scale: 6/10    General: patient is cooperative; mood and affect are appropriate; denies any fevers,chills or headache; denies any fatigue or weakness  HEENT:trachea is midline; no voice, visual or swallowing difficulty; good range of motion with neck; mucous membranes are normal  Neuro:alert and oriented; denies any dizziness; gait and coordination observed are normal; ambulates without assistance  Wound:thoracic incision dressings removed for unapprox incisional area with staples intact with yellow drainage on dressing. Left generator site healing well with no signs of infection  Number of sutures/staples removed:left generator site cleansed with betadine , removed all staples and intact sing a staple removal tool, re cleansed with betadine , benzoin placed and steri strips applied. Thoracic incision cleansed with betadine and a Telfa dressing applied    Assessment:  Post op check: Patient is here for initial post-op check. Dr. Denae Arreola  is the supervising physician in clinic today and approves of today's treatment plan; patient just finished Keflex- per Dr. Cong Youngblood examination try to obtain a culture and start Cipro 500 mg BID.  Culture obtained for a scant amount of drainage      Plan:  Post-op instructions: strict 2-5 pound weight limit; frequent positional changes; no bending ,lifting or twisting;instructed on proper body mechanics; instructed on short distance driving with considerations of no driving in pain,while on opioids and muscle relaxer's and able to safely check vehicle blind spot- restrictions for 4 weeks post op  Incisional care: not to saturate and pat dry;do not itch scratch or submerge  Management and expected pain:advised patient of expected nerve swelling and length of time for nerve healing; patient t contact Dr. Alen Fowler office for left sided sciatica pain  Post-op complications: patient instructed to observe for infection, deep vein thrombosis,constipation and pneumonia  Therapy/exercises: start a walking program  Return appointment with any follow-up films:Patient demonstrated knowledge of procedure and post op instructions; patient was given the opportunity for questions and clarification was provided- advised patient to contact the office or message center in My Chart for any concerns

## 2022-08-16 ENCOUNTER — TELEPHONE (OUTPATIENT)
Dept: NEUROSURGERY | Age: 57
End: 2022-08-16

## 2022-08-16 LAB
BACTERIA SPEC CULT: ABNORMAL
GRAM STN SPEC: ABNORMAL
SERVICE CMNT-IMP: ABNORMAL

## 2022-08-16 NOTE — TELEPHONE ENCOUNTER
Patient notified of wound culture per Dr. Junior Quiroz- continue Cipro- patient states she has had no wound drainage

## 2022-08-18 ENCOUNTER — NURSE ONLY (OUTPATIENT)
Dept: NEUROSURGERY | Age: 57
End: 2022-08-18

## 2022-08-18 ENCOUNTER — OFFICE VISIT (OUTPATIENT)
Dept: ORTHOPEDIC SURGERY | Age: 57
End: 2022-08-18
Payer: COMMERCIAL

## 2022-08-18 VITALS
WEIGHT: 204.13 LBS | DIASTOLIC BLOOD PRESSURE: 66 MMHG | SYSTOLIC BLOOD PRESSURE: 126 MMHG | HEART RATE: 76 BPM | HEIGHT: 63 IN | TEMPERATURE: 98.3 F | BODY MASS INDEX: 36.17 KG/M2 | OXYGEN SATURATION: 98 %

## 2022-08-18 DIAGNOSIS — Z09 SURGICAL FOLLOW-UP CARE: Primary | ICD-10-CM

## 2022-08-18 DIAGNOSIS — M76.71 PERONEAL TENDINITIS OF RIGHT LOWER EXTREMITY: Primary | ICD-10-CM

## 2022-08-18 DIAGNOSIS — Z96.89 STATUS POST INSERTION OF SPINAL CORD STIMULATOR: ICD-10-CM

## 2022-08-18 PROCEDURE — 99213 OFFICE O/P EST LOW 20 MIN: CPT | Performed by: ORTHOPAEDIC SURGERY

## 2022-08-18 NOTE — PROGRESS NOTES
Name: Maria Antonia Thompson  YOB: 1965  Gender: female  MRN: [id number]      Chief complaint: Right ankle stiffness    Procedure: Repair Brevis tendon    Surgery Date: 5/18/2022    Subjective: Denies fevers chills or infection. She has not been in therapy. She is doing better with her pain. She still describes some stiffness and soreness. Exam: Wound healing appropriately. No signs of dehiscence or infection. No signs of erythema or drainage. Neurovascularly intact of the foot. With toes warm and well-perfused. Still some swelling and edema. She is able to actively do ankle circles. 4-5 strength in eversion and inversion of the ankle. 5 out of 5 strength of plantarflexion. Mild tenderness over the peroneal tendons. Still some swelling noted. Imaging:   No imaging reviewed      Assessment/Plan:    She still continues to have swelling despite having these tendons fixed  He has not been in any therapy  Home exercise Program given today. I provided her Thera-Band today. I want her to try to use the Thera-Band and to strengthen out this ankle.   Follow-up in 3 months

## 2022-08-18 NOTE — PROGRESS NOTES
Subjective: Patient states she remains with BLE pain and she will follow up with her pain management MD      Lorena Aj is a 60 y.o. female who presents today for wound check. She has a surgical incision wound which is located on the thoracic and left hip area. Current symptoms: leg pain  wound healing as expected. Suture/Staple removal consent:verbal consent obtained and given by the patient. Risks of removal include bleeding,infection,re-opening and excessive scarring    Dr. Radha Sow  is the supervising physician in clinic today and approves of today's treatment plan. Objective:     /66 (Site: Left Upper Arm)   Pulse 76   Temp 98.3 °F (36.8 °C) (Temporal)   Ht 5' 3\" (1.6 m)   Wt 204 lb 2 oz (92.6 kg)   SpO2 98%   BMI 36.16 kg/m²     Wound:   wound margins intact and healing well. No signs of infection. Thoracic incision with localized redness irritation and around the surrounding area- patient states it itches; left generator site healing well with no signs of infection       Assessment:     Wound check. Interventions today visual inspection  cleansing with betadine solution. Staples removed and intact using a staple removal tool. Incision recleansed with betadine and left open to air    Plan:     1. Discussed appropriate home care of this wound. Finish antibiotics  2. Patient instructions were given. 3. Follow up: further follow up with pain management- patient to call for any problems.

## 2022-08-19 LAB
BACTERIA SPEC CULT: NORMAL
SERVICE CMNT-IMP: NORMAL

## 2022-12-13 ENCOUNTER — OFFICE VISIT (OUTPATIENT)
Dept: ORTHOPEDIC SURGERY | Age: 57
End: 2022-12-13
Payer: COMMERCIAL

## 2022-12-13 DIAGNOSIS — M76.71 PERONEAL TENDINITIS OF RIGHT LOWER EXTREMITY: Primary | ICD-10-CM

## 2022-12-13 PROCEDURE — 99213 OFFICE O/P EST LOW 20 MIN: CPT | Performed by: ORTHOPAEDIC SURGERY

## 2022-12-13 NOTE — PROGRESS NOTES
Name: Lilibeth Shabazz  YOB: 1965  Gender: female  MRN: [id number]      Chief complaint: Right ankle stiffness    Procedure: Repair Brevis tendon    Surgery Date: 5/18/2022    Subjective: Denies fevers chills or infection. She is doing much better with pain. She denies any pain or signs of infection. She is walking in normal shoes. Her pain is a 1 or 2 at 10 at its worst.  She states at the end of the day it may be a little more tired than it is actually painful. She is now back to full activities and doing well. She has completed the home exercise program and is doing much better than her last visit. Exam: Wound healing appropriately. No signs of dehiscence or infection. No signs of erythema or drainage. No signs of infection. No swelling or edema noted about the ankle. Peroneal tendons are nontender to palpation. No pain with resisted inversion eversion or plantarflexion. Incisions well-healed. She is able to actively do ankle circles. 5-5 strength in eversion and inversion of the ankle. 5 out of 5 strength of plantarflexion. Cavovarus foot posture when standing.  - mild  Talar tilt exam : normal  Anterior drawer exam w/ ankle plantarflexed at 20 deg: normal    Neuro:  normal SILT to s/s/sp/dp/t. Reflexes normal: 1+ patella reflex bilaterally, 1+ achilles reflex bilaterally, negative babinski bilaterally. no signs of hyper reflexia or absent reflex    Vascular: radial=4/4, femoral=4/4, popliteal=4/4, dorsalis pedis=4/4,         Imaging:   No imaging reviewed      Assessment/Plan:    She is doing much better than her last visit. Her pain is almost all gone and she has very minimal swelling. Had a long discussion with her about protecting her peroneal tendons. I want her to wear small ankle brace whenever exercising or doing a lot of activity. I recommended avoidance of high impact exercises unless she is wearing ankle brace.     At this point she has reached maximum improvement and she no longer needs medical care. She has been released to full activities without limitations.   Follow-up as needed      Past Medical History:   Diagnosis Date    Anxiety and depression     medication    Chronic pain     Diabetes mellitus ([address]     \"borderline\" oral agents, does not check BS; started taking medications 7/7/22, A1c 5.9 5/3/22    Elevated liver enzymes 05/17/2022 5/17/22 states at PCP now to discuss- AST 39, ALT 53    Elevated liver enzymes 07/07/2022    Former smoker, stopped smoking in distant past     quit 2016  0.5 ppd x 32 years    GERD (gastroesophageal reflux disease)     nexium daily, 2-3 pillows qhs as needed, hiatal hernia    Hiatal hernia     History of kidney stones     last one 2007    Hypertension     medication    Migraines     controlled with med    Neuropathy     PONV (postoperative nausea and vomiting)     medication relieves; \"sensitive stomach\" request IV medications    Psychiatric disorder     aniexty    Restless legs syndrome (RLS)      Past Surgical History:   Procedure Laterality Date    ACHILLES TENDON SURGERY  2022    surgery    AXILLARY SURGERY  12/20/2013    mass excision    CERVICAL FUSION      ACDF C3-C4 and previous neck surgery    CHOLECYSTECTOMY, LAPAROSCOPIC  2000    COLONOSCOPY      ENDOMETRIAL ABLATION  15 yrs ago    Ablation    ENDOSCOPY, COLON, DIAGNOSTIC      LUMBAR LAMINECTOMY  2002    L4-L5 herniated disk    NOSE SURGERY      4 surgies to repair nose    PAIN MANAGEMENT PROCEDURE N/A 7/27/2022    SCS REMOVAL performed by Cy Tripathi MD at [facility] [address] 05/18/2022    STIMULATOR SURGERY N/A 07/15/2022    STIMULATOR INSERTION-Spinal Cord Stimulator Placement performed by Cy Tripathi MD at [address] [place], [place] [address]   age 25    UROLOGICAL SURGERY  2007    cysto

## 2023-01-18 ENCOUNTER — TELEPHONE (OUTPATIENT)
Dept: ORTHOPEDIC SURGERY | Age: 58
End: 2023-01-18

## 2023-02-08 ENCOUNTER — TRANSCRIBE ORDERS (OUTPATIENT)
Dept: SCHEDULING | Age: 58
End: 2023-02-08

## 2023-02-08 DIAGNOSIS — Z12.31 VISIT FOR SCREENING MAMMOGRAM: Primary | ICD-10-CM

## 2023-02-14 ENCOUNTER — OFFICE VISIT (OUTPATIENT)
Dept: ORTHOPEDIC SURGERY | Age: 58
End: 2023-02-14

## 2023-02-14 VITALS — BODY MASS INDEX: 37.33 KG/M2 | HEIGHT: 62 IN

## 2023-02-14 DIAGNOSIS — M77.11 RIGHT LATERAL EPICONDYLITIS: Primary | ICD-10-CM

## 2023-02-14 RX ORDER — BETAMETHASONE SODIUM PHOSPHATE AND BETAMETHASONE ACETATE 3; 3 MG/ML; MG/ML
6 INJECTION, SUSPENSION INTRA-ARTICULAR; INTRALESIONAL; INTRAMUSCULAR; SOFT TISSUE ONCE
Status: COMPLETED | OUTPATIENT
Start: 2023-02-14 | End: 2023-02-14

## 2023-02-14 RX ADMIN — BETAMETHASONE SODIUM PHOSPHATE AND BETAMETHASONE ACETATE 6 MG: 3; 3 INJECTION, SUSPENSION INTRA-ARTICULAR; INTRALESIONAL; INTRAMUSCULAR; SOFT TISSUE at 14:11

## 2023-02-15 NOTE — PROGRESS NOTES
Orthopaedic Hand Surgery Note    Name: Julien Regan  YOB: 1965  Gender: female  MRN: [id number]    CC: New patient referred for elbow pain    HPI: Patient is a 59 y.o. female with a chief complaint of right elbow pain. Pain is located on the lateral side of the elbow and radiates down the forearm, denies numbness and paresthesias. Symptoms have been going on for 2 - 3 years, pain is aggravated with lifting heavy objects, alleviated by rest. She has had a cortisone injection about 2 years ago which helped for quite a while. She has had an MRI    ROS/Meds/PSH/PMH/FH/SH: I personally reviewed the patients standard intake form. Pertinents are discussed in the HPI    Physical Examination:  Musculoskeletal:   Examination on the right upper extremity demonstrates normal sensation to light touch in the median, ulnar and radial distribution. There is  severe tenderness on the lateral epicondyle, no tenderness on the medial epicondyle or the olecranon, pain is aggravated by resisted wrist and finger extension, there is no pain with palpation of the radial tunnel, negative Tinel along the radial nerve tract, no pain with resisted supination, no pain with resisted pronation. Elbow range of motion is full and pain free. Imaging / Electrodiagnostic Tests:     Elbow  XR: AP, Lateral, Oblique views     Clinical Indication:    ICD-10-CM    1. Right lateral epicondylitis  M77.11 XR ELBOW RIGHT (MIN 3 VIEWS)     betamethasone acetate-betamethasone sodium phosphate (CELESTONE) injection 6 mg     INJECT TENDON ORIGIN/INSERT             Report: AP, lateral, oblique x-ray of the right elbow demonstrates a large calcific deposit adjacent to the lateral epicondyle, consistent with calcific tendonitis    Impression: as above     Deann Mcghee MD       I independently reviewed and interpreted MRI of the right elbow. There is low grade partial tearing at the common extensor origin.  There is a 13x9mm area of lobulated calcification adjacent to the lateral epicondyle. There are degenerative changes at the radiocapitellar joint. Assessment:     ICD-10-CM    1. Right lateral epicondylitis  M77.11 XR ELBOW RIGHT (MIN 3 VIEWS)     betamethasone acetate-betamethasone sodium phosphate (CELESTONE) injection 6 mg     INJECT TENDON ORIGIN/INSERT          Plan:  We discussed the diagnosis and different treatment options. We discussed observation, bracing, cortisone injections, therapy and lateral epicondyle debridement with tendon reattachment surgery. We discussed that lateral epicondylitis is a chronic condition that has been progressing for much longer than the symptoms were evident, this is caused by degeneration of the tendon due to poor vascular supply that occurred over a long period of time, the patient understands that this condition will likely persist for a long time without medical treatment but that a large percentage of patients report improvement of symptoms after 1 year. However, some patients require surgical intervention at some point despite some short-term benefits of conservative treatment. After discussing the risks, benefits and alternatives of all treatment options in detail, the patient elects for cortisone injection. I offered a referral to OT, but she declined. I discussed with the patient that given the fairly significant calcific tendonitis of the common extensor origin, and that she has failed conservative management for over 2 years, that pain may not subside without surgical treatment. She does not wish to pursue surgery at this time. She will follow up as needed. Patient voiced accordance and understanding of the information provided and the formulated plan. All questions were answered to the patient's satisfaction during the encounter.       Shade Garcia MD  Orthopaedic Surgery  02/15/23  11:45 AM

## 2023-04-25 ENCOUNTER — OFFICE VISIT (OUTPATIENT)
Dept: ORTHOPEDIC SURGERY | Age: 58
End: 2023-04-25
Payer: COMMERCIAL

## 2023-04-25 DIAGNOSIS — M77.11 RIGHT LATERAL EPICONDYLITIS: Primary | ICD-10-CM

## 2023-04-25 PROCEDURE — 99214 OFFICE O/P EST MOD 30 MIN: CPT | Performed by: ORTHOPAEDIC SURGERY

## 2023-04-25 NOTE — PROGRESS NOTES
Orthopaedic Hand Surgery Note    Name: Deisy Cooper  YOB: 1965  Gender: female  MRN: [id number]    Follow up: Elbow Pain    HPI: Patient is a 59 y.o. female who return for evaluation of Right lateral epicondylitis. On the last visit we provided cortisone injection. She is ready to proceed with surgery. ROS/Meds/PSH/PMH/FH/SH: I personally reviewed the patients standard intake form. Pertinents are discussed in the HPI    Physical Examination:    Musculoskeletal:   Examination on the right upper extremity demonstrates normal sensation to light touch in the median, ulnar and radial distribution. There is  severe tenderness on the lateral epicondyle, no tenderness on the medial epicondyle or the olecranon, pain is aggravated by resisted wrist and finger extension, there is no pain with palpation of the radial tunnel, negative Tinel along the radial nerve tract, no pain with resisted supination, no pain with resisted pronation. Elbow range of motion is full and pain free. Imaging / Electrodiagnostic Tests:     none    Assessment:     ICD-10-CM    1. Right lateral epicondylitis  M77.11           Plan:  We discussed the diagnosis and different treatment options. We discussed observation, bracing, cortisone injections, therapy and lateral epicondyle debridement with tendon reattachment surgery. We discussed that lateral epicondylitis is a chronic condition that has been progressing for much longer than the symptoms were evident, this is caused by degeneration of the tendon due to poor vascular supply that occurred over a long period of time, the patient understands that this condition will likely persist for a long time without medical treatment but that a large percentage of patients report improvement of symptoms after 1 year. However, some patients require surgical intervention at some point despite some short-term benefits of conservative treatment.  After discussing the risks, benefits and

## 2023-04-26 PROBLEM — M77.11 RIGHT LATERAL EPICONDYLITIS: Status: ACTIVE | Noted: 2023-04-26

## 2023-05-01 ENCOUNTER — HOSPITAL ENCOUNTER (OUTPATIENT)
Dept: LAB | Age: 58
Discharge: HOME | End: 2023-05-04
Payer: COMMERCIAL

## 2023-05-01 DIAGNOSIS — Z01.818 PREOP TESTING: ICD-10-CM

## 2023-05-01 LAB — POTASSIUM SERPL-SCNC: 3.5 MMOL/L (ref 3.5–5.1)

## 2023-05-01 PROCEDURE — 36415 COLL VENOUS BLD VENIPUNCTURE: CPT

## 2023-05-01 PROCEDURE — 84132 ASSAY OF SERUM POTASSIUM: CPT

## 2023-05-08 ENCOUNTER — TELEPHONE (OUTPATIENT)
Dept: ORTHOPEDIC SURGERY | Age: 58
End: 2023-05-08

## 2023-05-08 NOTE — TELEPHONE ENCOUNTER
Patient calling to say her surgery was cancelled and wants to know if she can be referred to someone

## 2023-05-09 NOTE — TELEPHONE ENCOUNTER
I called and spoke with patient, she would like a referral to the hand center. I advised her I would work on that and they would call her with an appointment. Patient voiced understanding.

## (undated) DEVICE — STERILE PVP: Brand: MEDLINE INDUSTRIES, INC.

## (undated) DEVICE — STANDARD HYPODERMIC NEEDLE,POLYPROPYLENE HUB: Brand: MONOJECT

## (undated) DEVICE — 2000CC GUARDIAN II: Brand: GUARDIAN

## (undated) DEVICE — AGENT HEMSTAT W3XL4IN OXIDIZED REGENERATED CELOS ABSRB FOR

## (undated) DEVICE — SUTURE VCRL VIO BR 0 18IN C/R M04 J701D

## (undated) DEVICE — SPINE NEURO: Brand: MEDLINE INDUSTRIES, INC.

## (undated) DEVICE — SUTURE VCRL + SZ 3-0 L18IN ABSRB UD SH 1/2 CIR TAPERCUT NDL VCP864D

## (undated) DEVICE — SYR 3ML LL TIP 1/10ML GRAD --

## (undated) DEVICE — BIPOLAR SEALER 23-113-1 AQM 2.3 OM NEURO: Brand: AQUAMANTYS ®

## (undated) DEVICE — SURGIFOAM SPNG SZ 100

## (undated) DEVICE — 3M™ TEGADERM™ TRANSPARENT FILM DRESSING FRAME STYLE, 1628, 6 IN X 8 IN (15 CM X 20 CM), 10/CT 8CT/CASE: Brand: 3M™ TEGADERM™

## (undated) DEVICE — POWDER HEMOSTAT GEL 1GR --

## (undated) DEVICE — DRAPE SHT 3 QTR PROXIMA 53X77 --

## (undated) DEVICE — AGENT HEMSTAT 8ML FLX TIP MTRX + DISP SURGIFLO

## (undated) DEVICE — NEEDLE HYPO 25GA L1.5IN BLU POLYPR HUB S STL REG BVL STR

## (undated) DEVICE — AGENT HEMOSTATIC SURGIFLOW MATRIX KIT W/THROMBIN

## (undated) DEVICE — SUTURE VCRL SZ 3-0 L18IN ABSRB UD L26MM SH 1/2 CIR J864D

## (undated) DEVICE — GLOVE ORANGE PI 7 1/2   MSG9075

## (undated) DEVICE — REM POLYHESIVE ADULT PATIENT RETURN ELECTRODE: Brand: VALLEYLAB

## (undated) DEVICE — GLOVE SURG SZ 8 L12IN FNGR THK79MIL GRN LTX FREE

## (undated) DEVICE — GOWN,SIRUS,NONRNF,SETINSLV,XL,20/CS: Brand: MEDLINE

## (undated) DEVICE — GOWN,BREATHABLE SLV,AURORA,LG,STRL: Brand: MEDLINE

## (undated) DEVICE — Device: Brand: JELCO

## (undated) DEVICE — INTENDED FOR TISSUE SEPARATION, AND OTHER PROCEDURES THAT REQUIRE A SHARP SURGICAL BLADE TO PUNCTURE OR CUT.: Brand: BARD-PARKER SAFETY BLADES SIZE 15, STERILE

## (undated) DEVICE — DRESSING POSTOP AG PRISMASEAL 3.5X6IN

## (undated) DEVICE — GARMENT,MEDLINE,DVT,INT,CALF,MED, GEN2: Brand: MEDLINE

## (undated) DEVICE — SOLUTION IRRIG 1000ML 09% SOD CHL USP PIC PLAS CONTAINER

## (undated) DEVICE — CATHETER IV NDL L1.25IN 16GA GRY POLYUR WNG HUB DISP FOR

## (undated) DEVICE — SYR LR LCK 1ML GRAD NSAF 30ML --

## (undated) DEVICE — GLOVE SURG SZ 85 L12IN FNGR ORTHO 126MIL CRM LTX FREE

## (undated) DEVICE — GOWN,PREVENTION PLUS,2XL,ST,22/CS: Brand: MEDLINE

## (undated) DEVICE — SUTURE PERMAHAND SZ 0 L30IN NONABSORBABLE BLK L30MM PSL 3/8 590H

## (undated) DEVICE — UNIVERSAL DRAPES: Brand: MEDLINE INDUSTRIES, INC.

## (undated) DEVICE — KARLIN BLADE, ULTRA KNIFE, SATIN, STAINLESS STEEL, 1.5 MM TIP, 4 IN, SINGLE USE, (10/PK): Brand: SYMMETRY SURGICAL

## (undated) DEVICE — WAX SURG 2.5GM HEMSTAT BNE BEESWAX PARAFFIN ISO PALMITATE

## (undated) DEVICE — (D)PREP SKN CHLRAPRP APPL 26ML -- CONVERT TO ITEM 371833

## (undated) DEVICE — DRAPE XR C ARM 41X74IN LF --

## (undated) DEVICE — DISPOSABLE STANDARD BIPOLAR FORCEPS, NON-STICK,: Brand: SPETZLER-MALIS

## (undated) DEVICE — SUTURE VCRL SZ 2-0 L27IN ABSRB UD L26MM CT-2 1/2 CIR J269H

## (undated) DEVICE — SPINE PACK: Brand: MEDLINE INDUSTRIES, INC.

## (undated) DEVICE — GLOVE ORTHO 8   MSG9480

## (undated) DEVICE — SPLINT CAST W5XL30IN GRN STRENGTH PLSTR OF PARIS FAST SET

## (undated) DEVICE — DRAPE TWL SURG 16X26IN BLU ORB04] ALLCARE INC]

## (undated) DEVICE — CASPAR DISTR PIN12MMSTER: Brand: AESCULAP

## (undated) DEVICE — SYR 10ML LUER LOK 1/5ML GRAD --

## (undated) DEVICE — CARBIDE MATCH HEAD

## (undated) DEVICE — FOOT DR TOLLISON & WOMACK: Brand: MEDLINE INDUSTRIES, INC.

## (undated) DEVICE — SUTURE FIBERWIRE 3-0 L18IN NONABSORBABLE BLU L15MM 3/8 CIR AR722701

## (undated) DEVICE — C-ARM: Brand: UNBRANDED

## (undated) DEVICE — SOLUTION IV 1000ML 0.9% SOD CHL

## (undated) DEVICE — Z CONVERTED USE 2421973 CONTAINER SPEC 60/30ML 10% FRMLN POLYPR PREFIL

## (undated) DEVICE — GOWN,REINFORCED,POLY,AURORA,XXLARGE,STR: Brand: MEDLINE

## (undated) DEVICE — SYSTEM SKIN CLSR 22CM DERMBND PRINEO

## (undated) DEVICE — INSULATED BLADE ELECTRODE: Brand: EDGE

## (undated) DEVICE — NDL SPNE QNCKE 18GX3.5IN LF --

## (undated) DEVICE — DERMABOND SKIN ADH 0.7ML -- DERMABOND ADVANCED 12/BX

## (undated) DEVICE — POWDER SURG CELLERATE RX 1 GM HYDROL COLLEGEN